# Patient Record
Sex: FEMALE | Race: WHITE | URBAN - METROPOLITAN AREA
[De-identification: names, ages, dates, MRNs, and addresses within clinical notes are randomized per-mention and may not be internally consistent; named-entity substitution may affect disease eponyms.]

---

## 2017-01-04 ENCOUNTER — COMMUNICATION - HEALTHEAST (OUTPATIENT)
Dept: INTERNAL MEDICINE | Facility: CLINIC | Age: 82
End: 2017-01-04

## 2017-01-04 ENCOUNTER — RECORDS - HEALTHEAST (OUTPATIENT)
Dept: ADMINISTRATIVE | Facility: OTHER | Age: 82
End: 2017-01-04

## 2017-01-04 DIAGNOSIS — R30.0 DYSURIA: ICD-10-CM

## 2017-01-05 ENCOUNTER — COMMUNICATION - HEALTHEAST (OUTPATIENT)
Dept: INTERNAL MEDICINE | Facility: CLINIC | Age: 82
End: 2017-01-05

## 2017-01-07 ENCOUNTER — COMMUNICATION - HEALTHEAST (OUTPATIENT)
Dept: SCHEDULING | Facility: CLINIC | Age: 82
End: 2017-01-07

## 2017-01-12 ENCOUNTER — COMMUNICATION - HEALTHEAST (OUTPATIENT)
Dept: INTERNAL MEDICINE | Facility: CLINIC | Age: 82
End: 2017-01-12

## 2017-01-12 DIAGNOSIS — N30.20 CHRONIC CYSTITIS: ICD-10-CM

## 2017-01-12 DIAGNOSIS — N39.0 UTI (URINARY TRACT INFECTION): ICD-10-CM

## 2017-01-20 ENCOUNTER — RECORDS - HEALTHEAST (OUTPATIENT)
Dept: ADMINISTRATIVE | Facility: OTHER | Age: 82
End: 2017-01-20

## 2017-04-24 ENCOUNTER — HOSPITAL ENCOUNTER (OUTPATIENT)
Dept: NEUROLOGY | Facility: CLINIC | Age: 82
Setting detail: THERAPIES SERIES
Discharge: STILL ADMITTED OR ADMINISTRATIVE | End: 2017-04-24
Attending: PSYCHIATRY & NEUROLOGY

## 2017-04-24 DIAGNOSIS — F02.80 DEMENTIA OF THE ALZHEIMER'S TYPE (H): ICD-10-CM

## 2017-04-24 DIAGNOSIS — G30.9 DEMENTIA OF THE ALZHEIMER'S TYPE (H): ICD-10-CM

## 2017-04-26 ENCOUNTER — COMMUNICATION - HEALTHEAST (OUTPATIENT)
Dept: NEUROLOGY | Facility: CLINIC | Age: 82
End: 2017-04-26

## 2017-04-27 ENCOUNTER — RECORDS - HEALTHEAST (OUTPATIENT)
Dept: ADMINISTRATIVE | Facility: OTHER | Age: 82
End: 2017-04-27

## 2017-05-03 ENCOUNTER — AMBULATORY - HEALTHEAST (OUTPATIENT)
Dept: NEUROLOGY | Facility: CLINIC | Age: 82
End: 2017-05-03

## 2017-05-03 ENCOUNTER — HOSPITAL ENCOUNTER (OUTPATIENT)
Dept: NEUROLOGY | Facility: CLINIC | Age: 82
Setting detail: THERAPIES SERIES
Discharge: STILL ADMITTED OR ADMINISTRATIVE | End: 2017-05-03
Attending: PSYCHIATRY & NEUROLOGY

## 2017-05-03 ENCOUNTER — HOSPITAL ENCOUNTER (OUTPATIENT)
Dept: OCCUPATIONAL THERAPY | Age: 82
Setting detail: THERAPIES SERIES
Discharge: STILL ADMITTED OR ADMINISTRATIVE | End: 2017-05-03
Attending: PSYCHIATRY & NEUROLOGY

## 2017-05-03 DIAGNOSIS — M81.0 OSTEOPOROSIS: ICD-10-CM

## 2017-05-03 DIAGNOSIS — R41.3 MEMORY LOSS: ICD-10-CM

## 2017-05-03 DIAGNOSIS — F03.90 DEMENTIA (H): ICD-10-CM

## 2017-05-03 DIAGNOSIS — R94.31 NONSPECIFIC ABNORMAL ELECTROCARDIOGRAM (ECG) (EKG): ICD-10-CM

## 2017-05-03 DIAGNOSIS — R41.89 COGNITIVE IMPAIRMENT: ICD-10-CM

## 2017-05-03 DIAGNOSIS — I10 ESSENTIAL HYPERTENSION: ICD-10-CM

## 2017-05-11 ENCOUNTER — COMMUNICATION - HEALTHEAST (OUTPATIENT)
Dept: NEUROLOGY | Facility: CLINIC | Age: 82
End: 2017-05-11

## 2017-05-11 DIAGNOSIS — F32.A DEPRESSION: ICD-10-CM

## 2017-05-16 ENCOUNTER — RECORDS - HEALTHEAST (OUTPATIENT)
Dept: ADMINISTRATIVE | Facility: OTHER | Age: 82
End: 2017-05-16

## 2017-05-18 ENCOUNTER — HOSPITAL ENCOUNTER (OUTPATIENT)
Dept: NEUROLOGY | Facility: CLINIC | Age: 82
Setting detail: THERAPIES SERIES
Discharge: STILL ADMITTED OR ADMINISTRATIVE | End: 2017-05-18
Attending: PSYCHIATRY & NEUROLOGY

## 2017-05-18 DIAGNOSIS — F03.90 UNSPECIFIED DEMENTIA WITHOUT BEHAVIORAL DISTURBANCE: ICD-10-CM

## 2017-05-22 ENCOUNTER — COMMUNICATION - HEALTHEAST (OUTPATIENT)
Dept: INTERNAL MEDICINE | Facility: CLINIC | Age: 82
End: 2017-05-22

## 2017-07-31 ENCOUNTER — HOSPITAL ENCOUNTER (OUTPATIENT)
Dept: NEUROLOGY | Facility: CLINIC | Age: 82
Setting detail: THERAPIES SERIES
Discharge: STILL ADMITTED OR ADMINISTRATIVE | End: 2017-07-31
Attending: PSYCHIATRY & NEUROLOGY

## 2017-07-31 DIAGNOSIS — F32.A DEPRESSION: ICD-10-CM

## 2017-08-18 ENCOUNTER — COMMUNICATION - HEALTHEAST (OUTPATIENT)
Dept: INTERNAL MEDICINE | Facility: CLINIC | Age: 82
End: 2017-08-18

## 2017-08-21 ENCOUNTER — COMMUNICATION - HEALTHEAST (OUTPATIENT)
Dept: INTERNAL MEDICINE | Facility: CLINIC | Age: 82
End: 2017-08-21

## 2017-08-28 ENCOUNTER — COMMUNICATION - HEALTHEAST (OUTPATIENT)
Dept: INTERNAL MEDICINE | Facility: CLINIC | Age: 82
End: 2017-08-28

## 2017-08-28 DIAGNOSIS — R42 VERTIGO: ICD-10-CM

## 2017-09-07 ENCOUNTER — OFFICE VISIT - HEALTHEAST (OUTPATIENT)
Dept: INTERNAL MEDICINE | Facility: CLINIC | Age: 82
End: 2017-09-07

## 2017-09-07 DIAGNOSIS — R41.3 MEMORY LOSS: ICD-10-CM

## 2017-09-07 DIAGNOSIS — I10 BENIGN ESSENTIAL HTN: ICD-10-CM

## 2017-09-07 DIAGNOSIS — F02.80 LATE ONSET ALZHEIMER'S DISEASE WITHOUT BEHAVIORAL DISTURBANCE (H): ICD-10-CM

## 2017-09-07 DIAGNOSIS — G30.1 LATE ONSET ALZHEIMER'S DISEASE WITHOUT BEHAVIORAL DISTURBANCE (H): ICD-10-CM

## 2017-09-07 DIAGNOSIS — I51.9 HEART DISEASE, UNSPECIFIED: ICD-10-CM

## 2017-09-07 ASSESSMENT — MIFFLIN-ST. JEOR: SCORE: 1287.29

## 2017-09-26 ENCOUNTER — COMMUNICATION - HEALTHEAST (OUTPATIENT)
Dept: INTERNAL MEDICINE | Facility: CLINIC | Age: 82
End: 2017-09-26

## 2017-09-27 ENCOUNTER — RECORDS - HEALTHEAST (OUTPATIENT)
Dept: ADMINISTRATIVE | Facility: OTHER | Age: 82
End: 2017-09-27

## 2017-09-27 ENCOUNTER — AMBULATORY - HEALTHEAST (OUTPATIENT)
Dept: INTERNAL MEDICINE | Facility: CLINIC | Age: 82
End: 2017-09-27

## 2017-10-11 ENCOUNTER — COMMUNICATION - HEALTHEAST (OUTPATIENT)
Dept: INTERNAL MEDICINE | Facility: CLINIC | Age: 82
End: 2017-10-11

## 2017-11-14 ENCOUNTER — COMMUNICATION - HEALTHEAST (OUTPATIENT)
Dept: INTERNAL MEDICINE | Facility: CLINIC | Age: 82
End: 2017-11-14

## 2017-12-19 ENCOUNTER — COMMUNICATION - HEALTHEAST (OUTPATIENT)
Dept: SCHEDULING | Facility: CLINIC | Age: 82
End: 2017-12-19

## 2017-12-26 ENCOUNTER — COMMUNICATION - HEALTHEAST (OUTPATIENT)
Dept: INTERNAL MEDICINE | Facility: CLINIC | Age: 82
End: 2017-12-26

## 2017-12-26 DIAGNOSIS — F03.90 DEMENTIA (H): ICD-10-CM

## 2018-01-03 ENCOUNTER — AMBULATORY - HEALTHEAST (OUTPATIENT)
Dept: INTERNAL MEDICINE | Facility: CLINIC | Age: 83
End: 2018-01-03

## 2018-01-03 DIAGNOSIS — R53.83 FATIGUE: ICD-10-CM

## 2018-01-04 ENCOUNTER — OFFICE VISIT - HEALTHEAST (OUTPATIENT)
Dept: INTERNAL MEDICINE | Facility: CLINIC | Age: 83
End: 2018-01-04

## 2018-01-04 DIAGNOSIS — R79.89 ELEVATED LFTS: ICD-10-CM

## 2018-01-04 DIAGNOSIS — K21.9 GERD (GASTROESOPHAGEAL REFLUX DISEASE): ICD-10-CM

## 2018-01-04 DIAGNOSIS — R05.9 COUGH: ICD-10-CM

## 2018-01-04 DIAGNOSIS — F02.80 LATE ONSET ALZHEIMER'S DISEASE WITHOUT BEHAVIORAL DISTURBANCE (H): ICD-10-CM

## 2018-01-04 DIAGNOSIS — N39.0 UTI (URINARY TRACT INFECTION): ICD-10-CM

## 2018-01-04 DIAGNOSIS — G30.1 LATE ONSET ALZHEIMER'S DISEASE WITHOUT BEHAVIORAL DISTURBANCE (H): ICD-10-CM

## 2018-01-04 DIAGNOSIS — D72.829 LEUKOCYTOSIS: ICD-10-CM

## 2018-01-04 LAB
ALBUMIN SERPL-MCNC: 3 G/DL (ref 3.5–5)
ALP SERPL-CCNC: 267 U/L (ref 45–120)
ALT SERPL W P-5'-P-CCNC: 103 U/L (ref 0–45)
ANION GAP SERPL CALCULATED.3IONS-SCNC: 14 MMOL/L (ref 5–18)
AST SERPL W P-5'-P-CCNC: 64 U/L (ref 0–40)
BASOPHILS # BLD AUTO: 0 THOU/UL (ref 0–0.2)
BASOPHILS NFR BLD AUTO: 0 % (ref 0–2)
BILIRUB SERPL-MCNC: 0.8 MG/DL (ref 0–1)
BUN SERPL-MCNC: 29 MG/DL (ref 8–28)
CALCIUM SERPL-MCNC: 9.6 MG/DL (ref 8.5–10.5)
CHLORIDE BLD-SCNC: 100 MMOL/L (ref 98–107)
CO2 SERPL-SCNC: 27 MMOL/L (ref 22–31)
CREAT SERPL-MCNC: 1.05 MG/DL (ref 0.6–1.1)
EOSINOPHIL COUNT (ABSOLUTE): 0 THOU/UL (ref 0–0.4)
EOSINOPHIL NFR BLD AUTO: 0 % (ref 0–6)
ERYTHROCYTE [DISTWIDTH] IN BLOOD BY AUTOMATED COUNT: 11.8 % (ref 11–14.5)
FLUAV AG SPEC QL IA: NORMAL
FLUBV AG SPEC QL IA: NORMAL
GFR SERPL CREATININE-BSD FRML MDRD: 50 ML/MIN/1.73M2
GLUCOSE BLD-MCNC: 139 MG/DL (ref 70–125)
HCT VFR BLD AUTO: 42.4 % (ref 35–47)
HGB BLD-MCNC: 13.8 G/DL (ref 12–16)
LYMPHOCYTES # BLD AUTO: 1.7 THOU/UL (ref 0.8–4.4)
LYMPHOCYTES NFR BLD AUTO: 8 % (ref 20–40)
MCH RBC QN AUTO: 28.8 PG (ref 27–34)
MCHC RBC AUTO-ENTMCNC: 32.6 G/DL (ref 32–36)
MCV RBC AUTO: 88 FL (ref 80–100)
MONOCYTES # BLD AUTO: 1.5 THOU/UL (ref 0–0.9)
MONOCYTES NFR BLD AUTO: 7 % (ref 2–10)
PLAT MORPH BLD: NORMAL
PLATELET # BLD AUTO: 406 THOU/UL (ref 140–440)
PMV BLD AUTO: 7.9 FL (ref 7–10)
POTASSIUM BLD-SCNC: 4.1 MMOL/L (ref 3.5–5)
PROT SERPL-MCNC: 7 G/DL (ref 6–8)
RBC # BLD AUTO: 4.81 MILL/UL (ref 3.8–5.4)
SODIUM SERPL-SCNC: 141 MMOL/L (ref 136–145)
TOTAL NEUTROPHILS-ABS(DIFF): 17.7 THOU/UL (ref 2–7.7)
TOTAL NEUTROPHILS-REL(DIFF): 85 % (ref 50–70)
WBC: 20.8 THOU/UL (ref 4–11)

## 2018-01-06 ENCOUNTER — RECORDS - HEALTHEAST (OUTPATIENT)
Dept: LAB | Facility: CLINIC | Age: 83
End: 2018-01-06

## 2018-01-06 LAB
ALBUMIN UR-MCNC: ABNORMAL MG/DL
APPEARANCE UR: ABNORMAL
BACTERIA #/AREA URNS HPF: ABNORMAL HPF
BILIRUB UR QL STRIP: NEGATIVE
COLOR UR AUTO: ABNORMAL
GLUCOSE UR STRIP-MCNC: NEGATIVE MG/DL
HGB UR QL STRIP: NEGATIVE
KETONES UR STRIP-MCNC: ABNORMAL MG/DL
LEUKOCYTE ESTERASE UR QL STRIP: ABNORMAL
MUCOUS THREADS #/AREA URNS LPF: ABNORMAL LPF
NITRATE UR QL: POSITIVE
PH UR STRIP: 5.5 [PH] (ref 4.5–8)
RBC #/AREA URNS AUTO: ABNORMAL HPF
RENAL EPI CELLS #/AREA URNS HPF: ABNORMAL LPF
SP GR UR STRIP: 1.02 (ref 1–1.03)
SQUAMOUS #/AREA URNS AUTO: ABNORMAL LPF
TRANS CELLS #/AREA URNS HPF: ABNORMAL LPF
UROBILINOGEN UR STRIP-ACNC: ABNORMAL
WBC #/AREA URNS AUTO: >100 HPF

## 2018-01-08 ENCOUNTER — HOSPITAL ENCOUNTER (OUTPATIENT)
Dept: CT IMAGING | Facility: CLINIC | Age: 83
Discharge: HOME | End: 2018-01-08
Attending: INTERNAL MEDICINE

## 2018-01-08 DIAGNOSIS — R79.89 ELEVATED LFTS: ICD-10-CM

## 2018-01-08 DIAGNOSIS — D72.829 LEUKOCYTOSIS: ICD-10-CM

## 2018-01-08 LAB
BACTERIA SPEC CULT: ABNORMAL
BACTERIA SPEC CULT: ABNORMAL

## 2018-01-09 ENCOUNTER — COMMUNICATION - HEALTHEAST (OUTPATIENT)
Dept: INTERNAL MEDICINE | Facility: CLINIC | Age: 83
End: 2018-01-09

## 2018-01-10 ENCOUNTER — OFFICE VISIT - HEALTHEAST (OUTPATIENT)
Dept: FAMILY MEDICINE | Facility: CLINIC | Age: 83
End: 2018-01-10

## 2018-01-10 ENCOUNTER — COMMUNICATION - HEALTHEAST (OUTPATIENT)
Dept: SCHEDULING | Facility: CLINIC | Age: 83
End: 2018-01-10

## 2018-01-10 ENCOUNTER — COMMUNICATION - HEALTHEAST (OUTPATIENT)
Dept: INTERNAL MEDICINE | Facility: CLINIC | Age: 83
End: 2018-01-10

## 2018-01-10 DIAGNOSIS — R10.9 ABDOMINAL PAIN: ICD-10-CM

## 2018-01-10 DIAGNOSIS — G30.9 DEMENTIA OF THE ALZHEIMER'S TYPE (H): ICD-10-CM

## 2018-01-10 DIAGNOSIS — F02.80 DEMENTIA OF THE ALZHEIMER'S TYPE (H): ICD-10-CM

## 2018-01-10 DIAGNOSIS — I10 HYPERTENSION: ICD-10-CM

## 2018-01-10 LAB
ALBUMIN UR-MCNC: NEGATIVE MG/DL
APPEARANCE UR: CLEAR
BASOPHILS # BLD AUTO: 0.2 THOU/UL (ref 0–0.2)
BASOPHILS NFR BLD AUTO: 1 % (ref 0–2)
BILIRUB UR QL STRIP: NEGATIVE
COLOR UR AUTO: YELLOW
EOSINOPHIL # BLD AUTO: 0.3 THOU/UL (ref 0–0.4)
EOSINOPHIL NFR BLD AUTO: 2 % (ref 0–6)
ERYTHROCYTE [DISTWIDTH] IN BLOOD BY AUTOMATED COUNT: 11.5 % (ref 11–14.5)
GLUCOSE UR STRIP-MCNC: NEGATIVE MG/DL
HCT VFR BLD AUTO: 40.3 % (ref 35–47)
HGB BLD-MCNC: 13.4 G/DL (ref 12–16)
HGB UR QL STRIP: NEGATIVE
KETONES UR STRIP-MCNC: NEGATIVE MG/DL
LEUKOCYTE ESTERASE UR QL STRIP: NEGATIVE
LYMPHOCYTES # BLD AUTO: 2.4 THOU/UL (ref 0.8–4.4)
LYMPHOCYTES NFR BLD AUTO: 14 % (ref 20–40)
MCH RBC QN AUTO: 29.7 PG (ref 27–34)
MCHC RBC AUTO-ENTMCNC: 33.1 G/DL (ref 32–36)
MCV RBC AUTO: 90 FL (ref 80–100)
MONOCYTES # BLD AUTO: 1.3 THOU/UL (ref 0–0.9)
MONOCYTES NFR BLD AUTO: 7 % (ref 2–10)
NEUTROPHILS # BLD AUTO: 13.2 THOU/UL (ref 2–7.7)
NEUTROPHILS NFR BLD AUTO: 76 % (ref 50–70)
NITRATE UR QL: NEGATIVE
PH UR STRIP: 5.5 [PH] (ref 5–8)
PLATELET # BLD AUTO: 339 THOU/UL (ref 140–440)
PMV BLD AUTO: 7.8 FL (ref 7–10)
RBC # BLD AUTO: 4.49 MILL/UL (ref 3.8–5.4)
SP GR UR STRIP: 1.02 (ref 1–1.03)
UROBILINOGEN UR STRIP-ACNC: NORMAL
WBC: 17.4 THOU/UL (ref 4–11)

## 2018-01-12 ENCOUNTER — COMMUNICATION - HEALTHEAST (OUTPATIENT)
Dept: INTERNAL MEDICINE | Facility: CLINIC | Age: 83
End: 2018-01-12

## 2018-01-12 DIAGNOSIS — I48.91 ATRIAL FIBRILLATION (H): ICD-10-CM

## 2018-01-13 ENCOUNTER — HOME CARE/HOSPICE - HEALTHEAST (OUTPATIENT)
Dept: HOME HEALTH SERVICES | Facility: HOME HEALTH | Age: 83
End: 2018-01-13

## 2018-01-15 ENCOUNTER — HOME CARE/HOSPICE - HEALTHEAST (OUTPATIENT)
Dept: HOME HEALTH SERVICES | Facility: HOME HEALTH | Age: 83
End: 2018-01-15

## 2018-01-15 ENCOUNTER — AMBULATORY - HEALTHEAST (OUTPATIENT)
Dept: CARE COORDINATION | Facility: HOSPITAL | Age: 83
End: 2018-01-15

## 2018-01-16 ENCOUNTER — COMMUNICATION - HEALTHEAST (OUTPATIENT)
Dept: HOME HEALTH SERVICES | Facility: HOME HEALTH | Age: 83
End: 2018-01-16

## 2018-01-17 ENCOUNTER — HOME CARE/HOSPICE - HEALTHEAST (OUTPATIENT)
Dept: HOME HEALTH SERVICES | Facility: HOME HEALTH | Age: 83
End: 2018-01-17

## 2018-01-17 ENCOUNTER — OFFICE VISIT - HEALTHEAST (OUTPATIENT)
Dept: CARDIOLOGY | Facility: CLINIC | Age: 83
End: 2018-01-17

## 2018-01-17 DIAGNOSIS — I48.19 PERSISTENT ATRIAL FIBRILLATION (H): ICD-10-CM

## 2018-01-19 ENCOUNTER — COMMUNICATION - HEALTHEAST (OUTPATIENT)
Dept: INTERNAL MEDICINE | Facility: CLINIC | Age: 83
End: 2018-01-19

## 2018-01-19 ENCOUNTER — COMMUNICATION - HEALTHEAST (OUTPATIENT)
Dept: HOME HEALTH SERVICES | Facility: HOME HEALTH | Age: 83
End: 2018-01-19

## 2018-01-19 ENCOUNTER — HOME CARE/HOSPICE - HEALTHEAST (OUTPATIENT)
Dept: HOME HEALTH SERVICES | Facility: HOME HEALTH | Age: 83
End: 2018-01-19

## 2018-01-19 DIAGNOSIS — R19.7 DIARRHEA: ICD-10-CM

## 2018-01-22 ENCOUNTER — HOME CARE/HOSPICE - HEALTHEAST (OUTPATIENT)
Dept: HOME HEALTH SERVICES | Facility: HOME HEALTH | Age: 83
End: 2018-01-22

## 2018-01-22 ENCOUNTER — RECORDS - HEALTHEAST (OUTPATIENT)
Dept: LAB | Facility: CLINIC | Age: 83
End: 2018-01-22

## 2018-01-22 LAB
ANION GAP SERPL CALCULATED.3IONS-SCNC: 7 MMOL/L (ref 5–18)
BUN SERPL-MCNC: 34 MG/DL (ref 8–28)
CALCIUM SERPL-MCNC: 9 MG/DL (ref 8.5–10.5)
CHLORIDE BLD-SCNC: 112 MMOL/L (ref 98–107)
CO2 SERPL-SCNC: 28 MMOL/L (ref 22–31)
CREAT SERPL-MCNC: 0.88 MG/DL (ref 0.6–1.1)
GFR SERPL CREATININE-BSD FRML MDRD: >60 ML/MIN/1.73M2
GLUCOSE BLD-MCNC: 86 MG/DL (ref 70–125)
POTASSIUM BLD-SCNC: 3.8 MMOL/L (ref 3.5–5)
SODIUM SERPL-SCNC: 147 MMOL/L (ref 136–145)

## 2018-01-23 ENCOUNTER — HOME CARE/HOSPICE - HEALTHEAST (OUTPATIENT)
Dept: HOME HEALTH SERVICES | Facility: HOME HEALTH | Age: 83
End: 2018-01-23

## 2018-01-24 ENCOUNTER — HOME CARE/HOSPICE - HEALTHEAST (OUTPATIENT)
Dept: HOME HEALTH SERVICES | Facility: HOME HEALTH | Age: 83
End: 2018-01-24

## 2018-01-25 ENCOUNTER — COMMUNICATION - HEALTHEAST (OUTPATIENT)
Dept: INTERNAL MEDICINE | Facility: CLINIC | Age: 83
End: 2018-01-25

## 2018-01-25 ENCOUNTER — HOME CARE/HOSPICE - HEALTHEAST (OUTPATIENT)
Dept: HOME HEALTH SERVICES | Facility: HOME HEALTH | Age: 83
End: 2018-01-25

## 2018-01-31 ENCOUNTER — COMMUNICATION - HEALTHEAST (OUTPATIENT)
Dept: INTERNAL MEDICINE | Facility: CLINIC | Age: 83
End: 2018-01-31

## 2018-01-31 DIAGNOSIS — R05.9 COUGH: ICD-10-CM

## 2018-02-07 ENCOUNTER — RECORDS - HEALTHEAST (OUTPATIENT)
Dept: ADMINISTRATIVE | Facility: OTHER | Age: 83
End: 2018-02-07

## 2018-03-06 ENCOUNTER — COMMUNICATION - HEALTHEAST (OUTPATIENT)
Dept: INTERNAL MEDICINE | Facility: CLINIC | Age: 83
End: 2018-03-06

## 2018-05-01 ENCOUNTER — COMMUNICATION - HEALTHEAST (OUTPATIENT)
Dept: INTERNAL MEDICINE | Facility: CLINIC | Age: 83
End: 2018-05-01

## 2018-05-02 ENCOUNTER — OFFICE VISIT - HEALTHEAST (OUTPATIENT)
Dept: CARDIOLOGY | Facility: CLINIC | Age: 83
End: 2018-05-02

## 2018-05-02 DIAGNOSIS — I10 ESSENTIAL HYPERTENSION: ICD-10-CM

## 2018-05-02 DIAGNOSIS — I48.91 ATRIAL FIBRILLATION, UNSPECIFIED TYPE (H): ICD-10-CM

## 2018-05-03 ENCOUNTER — COMMUNICATION - HEALTHEAST (OUTPATIENT)
Dept: INTERNAL MEDICINE | Facility: CLINIC | Age: 83
End: 2018-05-03

## 2018-05-08 ENCOUNTER — COMMUNICATION - HEALTHEAST (OUTPATIENT)
Dept: INTERNAL MEDICINE | Facility: CLINIC | Age: 83
End: 2018-05-08

## 2018-05-25 ENCOUNTER — COMMUNICATION - HEALTHEAST (OUTPATIENT)
Dept: CARDIOLOGY | Facility: CLINIC | Age: 83
End: 2018-05-25

## 2018-06-12 ENCOUNTER — RECORDS - HEALTHEAST (OUTPATIENT)
Dept: LAB | Facility: CLINIC | Age: 83
End: 2018-06-12

## 2018-06-12 LAB
ANION GAP SERPL CALCULATED.3IONS-SCNC: 8 MMOL/L (ref 5–18)
BASOPHILS # BLD AUTO: 0 THOU/UL (ref 0–0.2)
BASOPHILS NFR BLD AUTO: 0 % (ref 0–2)
BUN SERPL-MCNC: 16 MG/DL (ref 8–28)
CALCIUM SERPL-MCNC: 9.3 MG/DL (ref 8.5–10.5)
CHLORIDE BLD-SCNC: 103 MMOL/L (ref 98–107)
CO2 SERPL-SCNC: 30 MMOL/L (ref 22–31)
CREAT SERPL-MCNC: 1.05 MG/DL (ref 0.6–1.1)
EOSINOPHIL # BLD AUTO: 0 THOU/UL (ref 0–0.4)
EOSINOPHIL NFR BLD AUTO: 0 % (ref 0–6)
ERYTHROCYTE [DISTWIDTH] IN BLOOD BY AUTOMATED COUNT: 14.2 % (ref 11–14.5)
GFR SERPL CREATININE-BSD FRML MDRD: 50 ML/MIN/1.73M2
GLUCOSE BLD-MCNC: 114 MG/DL (ref 70–125)
HCT VFR BLD AUTO: 41.6 % (ref 35–47)
HGB BLD-MCNC: 13.5 G/DL (ref 12–16)
LYMPHOCYTES # BLD AUTO: 1.4 THOU/UL (ref 0.8–4.4)
LYMPHOCYTES NFR BLD AUTO: 19 % (ref 20–40)
MCH RBC QN AUTO: 30.2 PG (ref 27–34)
MCHC RBC AUTO-ENTMCNC: 32.5 G/DL (ref 32–36)
MCV RBC AUTO: 93 FL (ref 80–100)
MONOCYTES # BLD AUTO: 0.4 THOU/UL (ref 0–0.9)
MONOCYTES NFR BLD AUTO: 5 % (ref 2–10)
NEUTROPHILS # BLD AUTO: 5.5 THOU/UL (ref 2–7.7)
NEUTROPHILS NFR BLD AUTO: 76 % (ref 50–70)
PLATELET # BLD AUTO: 259 THOU/UL (ref 140–440)
PMV BLD AUTO: 10.5 FL (ref 8.5–12.5)
POTASSIUM BLD-SCNC: 3.6 MMOL/L (ref 3.5–5)
RBC # BLD AUTO: 4.47 MILL/UL (ref 3.8–5.4)
SODIUM SERPL-SCNC: 141 MMOL/L (ref 136–145)
TSH SERPL DL<=0.005 MIU/L-ACNC: 1.69 UIU/ML (ref 0.3–5)
VIT B12 SERPL-MCNC: 349 PG/ML (ref 213–816)
WBC: 7.4 THOU/UL (ref 4–11)

## 2018-06-13 LAB — 25(OH)D3 SERPL-MCNC: 30.1 NG/ML (ref 30–80)

## 2018-07-27 ENCOUNTER — COMMUNICATION - HEALTHEAST (OUTPATIENT)
Dept: INTERNAL MEDICINE | Facility: CLINIC | Age: 83
End: 2018-07-27

## 2018-07-27 DIAGNOSIS — I48.19 PERSISTENT ATRIAL FIBRILLATION (H): ICD-10-CM

## 2018-07-28 ENCOUNTER — COMMUNICATION - HEALTHEAST (OUTPATIENT)
Dept: CARDIOLOGY | Facility: CLINIC | Age: 83
End: 2018-07-28

## 2018-07-28 DIAGNOSIS — I48.19 PERSISTENT ATRIAL FIBRILLATION (H): ICD-10-CM

## 2018-09-07 ENCOUNTER — COMMUNICATION - HEALTHEAST (OUTPATIENT)
Dept: NEUROLOGY | Facility: CLINIC | Age: 83
End: 2018-09-07

## 2018-11-21 ENCOUNTER — HOSPITAL ENCOUNTER (OUTPATIENT)
Dept: NEUROLOGY | Facility: CLINIC | Age: 83
Setting detail: THERAPIES SERIES
Discharge: STILL ADMITTED OR ADMINISTRATIVE | End: 2018-11-21
Attending: PSYCHIATRY & NEUROLOGY

## 2018-12-05 ENCOUNTER — COMMUNICATION - HEALTHEAST (OUTPATIENT)
Dept: NEUROLOGY | Facility: CLINIC | Age: 83
End: 2018-12-05

## 2018-12-05 DIAGNOSIS — G47.00 INSOMNIA, UNSPECIFIED: ICD-10-CM

## 2018-12-13 ENCOUNTER — COMMUNICATION - HEALTHEAST (OUTPATIENT)
Dept: NEUROLOGY | Facility: CLINIC | Age: 83
End: 2018-12-13

## 2018-12-13 DIAGNOSIS — G47.00 INSOMNIA, UNSPECIFIED: ICD-10-CM

## 2019-01-04 ENCOUNTER — HOSPITAL ENCOUNTER (OUTPATIENT)
Dept: NEUROLOGY | Facility: CLINIC | Age: 84
Setting detail: THERAPIES SERIES
Discharge: STILL ADMITTED OR ADMINISTRATIVE | End: 2019-01-04
Attending: PSYCHIATRY & NEUROLOGY

## 2019-01-09 ENCOUNTER — COMMUNICATION - HEALTHEAST (OUTPATIENT)
Dept: NEUROLOGY | Facility: CLINIC | Age: 84
End: 2019-01-09

## 2019-01-09 ENCOUNTER — AMBULATORY - HEALTHEAST (OUTPATIENT)
Dept: NEUROLOGY | Facility: CLINIC | Age: 84
End: 2019-01-09

## 2019-01-17 ENCOUNTER — COMMUNICATION - HEALTHEAST (OUTPATIENT)
Dept: NEUROLOGY | Facility: CLINIC | Age: 84
End: 2019-01-17

## 2019-01-21 ENCOUNTER — AMBULATORY - HEALTHEAST (OUTPATIENT)
Dept: NEUROLOGY | Facility: CLINIC | Age: 84
End: 2019-01-21

## 2019-02-01 ENCOUNTER — COMMUNICATION - HEALTHEAST (OUTPATIENT)
Dept: NEUROLOGY | Facility: CLINIC | Age: 84
End: 2019-02-01

## 2019-02-08 ENCOUNTER — COMMUNICATION - HEALTHEAST (OUTPATIENT)
Dept: NEUROLOGY | Facility: CLINIC | Age: 84
End: 2019-02-08

## 2019-02-08 ENCOUNTER — AMBULATORY - HEALTHEAST (OUTPATIENT)
Dept: NEUROLOGY | Facility: CLINIC | Age: 84
End: 2019-02-08

## 2019-02-12 ENCOUNTER — COMMUNICATION - HEALTHEAST (OUTPATIENT)
Dept: INTERNAL MEDICINE | Facility: CLINIC | Age: 84
End: 2019-02-12

## 2019-02-12 DIAGNOSIS — F03.90 DEMENTIA (H): ICD-10-CM

## 2019-03-11 ENCOUNTER — RECORDS - HEALTHEAST (OUTPATIENT)
Dept: LAB | Facility: CLINIC | Age: 84
End: 2019-03-11

## 2019-03-11 LAB
ALBUMIN UR-MCNC: ABNORMAL MG/DL
APPEARANCE UR: ABNORMAL
BACTERIA #/AREA URNS HPF: ABNORMAL HPF
BILIRUB UR QL STRIP: NEGATIVE
COLOR UR AUTO: YELLOW
GLUCOSE UR STRIP-MCNC: NEGATIVE MG/DL
HGB UR QL STRIP: NEGATIVE
KETONES UR STRIP-MCNC: NEGATIVE MG/DL
LEUKOCYTE ESTERASE UR QL STRIP: ABNORMAL
MUCOUS THREADS #/AREA URNS LPF: ABNORMAL LPF
NITRATE UR QL: POSITIVE
PH UR STRIP: 5.5 [PH] (ref 4.5–8)
RBC #/AREA URNS AUTO: ABNORMAL HPF
SP GR UR STRIP: 1.02 (ref 1–1.03)
SQUAMOUS #/AREA URNS AUTO: ABNORMAL LPF
UROBILINOGEN UR STRIP-ACNC: ABNORMAL
WBC #/AREA URNS AUTO: ABNORMAL HPF

## 2019-03-15 LAB — BACTERIA SPEC CULT: ABNORMAL

## 2019-05-12 ENCOUNTER — RECORDS - HEALTHEAST (OUTPATIENT)
Dept: LAB | Facility: CLINIC | Age: 84
End: 2019-05-12

## 2019-05-12 LAB
ALBUMIN UR-MCNC: NEGATIVE MG/DL
APPEARANCE UR: ABNORMAL
BACTERIA #/AREA URNS HPF: ABNORMAL HPF
BILIRUB UR QL STRIP: NEGATIVE
COLOR UR AUTO: YELLOW
GLUCOSE UR STRIP-MCNC: NEGATIVE MG/DL
HGB UR QL STRIP: NEGATIVE
KETONES UR STRIP-MCNC: NEGATIVE MG/DL
LEUKOCYTE ESTERASE UR QL STRIP: ABNORMAL
MUCOUS THREADS #/AREA URNS LPF: ABNORMAL LPF
NITRATE UR QL: POSITIVE
PH UR STRIP: 7 [PH] (ref 4.5–8)
RBC #/AREA URNS AUTO: ABNORMAL HPF
SP GR UR STRIP: 1.02 (ref 1–1.03)
SQUAMOUS #/AREA URNS AUTO: >100 LPF
UROBILINOGEN UR STRIP-ACNC: ABNORMAL
WBC #/AREA URNS AUTO: ABNORMAL HPF

## 2019-05-17 LAB
BACTERIA SPEC CULT: ABNORMAL
BACTERIA SPEC CULT: ABNORMAL

## 2019-07-10 ENCOUNTER — RECORDS - HEALTHEAST (OUTPATIENT)
Dept: LAB | Facility: CLINIC | Age: 84
End: 2019-07-10

## 2019-07-10 LAB
ANION GAP SERPL CALCULATED.3IONS-SCNC: 12 MMOL/L (ref 5–18)
BASOPHILS # BLD AUTO: 0 THOU/UL (ref 0–0.2)
BASOPHILS NFR BLD AUTO: 0 % (ref 0–2)
BUN SERPL-MCNC: 29 MG/DL (ref 8–28)
CALCIUM SERPL-MCNC: 9.4 MG/DL (ref 8.5–10.5)
CHLORIDE BLD-SCNC: 106 MMOL/L (ref 98–107)
CO2 SERPL-SCNC: 24 MMOL/L (ref 22–31)
CREAT SERPL-MCNC: 1.03 MG/DL (ref 0.6–1.1)
EOSINOPHIL # BLD AUTO: 0.1 THOU/UL (ref 0–0.4)
EOSINOPHIL NFR BLD AUTO: 0 % (ref 0–6)
ERYTHROCYTE [DISTWIDTH] IN BLOOD BY AUTOMATED COUNT: 14 % (ref 11–14.5)
GFR SERPL CREATININE-BSD FRML MDRD: 51 ML/MIN/1.73M2
GLUCOSE BLD-MCNC: 120 MG/DL (ref 70–125)
HCT VFR BLD AUTO: 41.1 % (ref 35–47)
HGB BLD-MCNC: 12.8 G/DL (ref 12–16)
LYMPHOCYTES # BLD AUTO: 1.2 THOU/UL (ref 0.8–4.4)
LYMPHOCYTES NFR BLD AUTO: 9 % (ref 20–40)
MCH RBC QN AUTO: 29.6 PG (ref 27–34)
MCHC RBC AUTO-ENTMCNC: 31.1 G/DL (ref 32–36)
MCV RBC AUTO: 95 FL (ref 80–100)
MONOCYTES # BLD AUTO: 0.2 THOU/UL (ref 0–0.9)
MONOCYTES NFR BLD AUTO: 2 % (ref 2–10)
NEUTROPHILS # BLD AUTO: 11.3 THOU/UL (ref 2–7.7)
NEUTROPHILS NFR BLD AUTO: 89 % (ref 50–70)
PLATELET # BLD AUTO: 295 THOU/UL (ref 140–440)
PMV BLD AUTO: 10.5 FL (ref 8.5–12.5)
POTASSIUM BLD-SCNC: 3.6 MMOL/L (ref 3.5–5)
PROCALCITONIN SERPL-MCNC: 0.05 NG/ML (ref 0–0.49)
RBC # BLD AUTO: 4.32 MILL/UL (ref 3.8–5.4)
SODIUM SERPL-SCNC: 142 MMOL/L (ref 136–145)
WBC: 12.8 THOU/UL (ref 4–11)

## 2019-09-24 ENCOUNTER — AMBULATORY - HEALTHEAST (OUTPATIENT)
Dept: OTHER | Facility: CLINIC | Age: 84
End: 2019-09-24

## 2019-09-24 ENCOUNTER — DOCUMENTATION ONLY (OUTPATIENT)
Dept: OTHER | Facility: CLINIC | Age: 84
End: 2019-09-24

## 2019-10-23 ENCOUNTER — HOSPITAL ENCOUNTER (OUTPATIENT)
Dept: NEUROLOGY | Facility: CLINIC | Age: 84
Setting detail: THERAPIES SERIES
Discharge: STILL ADMITTED OR ADMINISTRATIVE | End: 2019-10-23
Attending: PSYCHIATRY & NEUROLOGY

## 2019-10-28 ENCOUNTER — COMMUNICATION - HEALTHEAST (OUTPATIENT)
Dept: INTERNAL MEDICINE | Facility: CLINIC | Age: 84
End: 2019-10-28

## 2019-11-11 ENCOUNTER — COMMUNICATION - HEALTHEAST (OUTPATIENT)
Dept: NEUROLOGY | Facility: CLINIC | Age: 84
End: 2019-11-11

## 2019-11-11 DIAGNOSIS — F32.A DEPRESSION: ICD-10-CM

## 2019-11-14 ENCOUNTER — RECORDS - HEALTHEAST (OUTPATIENT)
Dept: LAB | Facility: CLINIC | Age: 84
End: 2019-11-14

## 2019-11-14 LAB
ALBUMIN UR-MCNC: ABNORMAL MG/DL
APPEARANCE UR: ABNORMAL
BACTERIA #/AREA URNS HPF: ABNORMAL HPF
BILIRUB UR QL STRIP: NEGATIVE
COLOR UR AUTO: YELLOW
GLUCOSE UR STRIP-MCNC: NEGATIVE MG/DL
HGB UR QL STRIP: NEGATIVE
HYALINE CASTS #/AREA URNS LPF: ABNORMAL LPF
KETONES UR STRIP-MCNC: NEGATIVE MG/DL
LEUKOCYTE ESTERASE UR QL STRIP: ABNORMAL
MUCOUS THREADS #/AREA URNS LPF: ABNORMAL LPF
NITRATE UR QL: POSITIVE
PH UR STRIP: 5.5 [PH] (ref 4.5–8)
RBC #/AREA URNS AUTO: ABNORMAL HPF
SP GR UR STRIP: 1.02 (ref 1–1.03)
SQUAMOUS #/AREA URNS AUTO: ABNORMAL LPF
UROBILINOGEN UR STRIP-ACNC: ABNORMAL
WBC #/AREA URNS AUTO: ABNORMAL HPF
WBC CLUMPS #/AREA URNS HPF: PRESENT /[HPF]

## 2019-11-16 LAB
BACTERIA SPEC CULT: ABNORMAL
BACTERIA SPEC CULT: ABNORMAL

## 2019-11-21 ENCOUNTER — RECORDS - HEALTHEAST (OUTPATIENT)
Dept: LAB | Facility: CLINIC | Age: 84
End: 2019-11-21

## 2019-11-21 LAB
ANION GAP SERPL CALCULATED.3IONS-SCNC: 10 MMOL/L (ref 5–18)
BASOPHILS # BLD AUTO: 0 THOU/UL (ref 0–0.2)
BASOPHILS NFR BLD AUTO: 0 % (ref 0–2)
BUN SERPL-MCNC: 27 MG/DL (ref 8–28)
C REACTIVE PROTEIN LHE: 2.9 MG/DL (ref 0–0.8)
CALCIUM SERPL-MCNC: 9 MG/DL (ref 8.5–10.5)
CHLORIDE BLD-SCNC: 105 MMOL/L (ref 98–107)
CO2 SERPL-SCNC: 29 MMOL/L (ref 22–31)
CREAT SERPL-MCNC: 1.22 MG/DL (ref 0.6–1.1)
EOSINOPHIL # BLD AUTO: 0.1 THOU/UL (ref 0–0.4)
EOSINOPHIL NFR BLD AUTO: 1 % (ref 0–6)
ERYTHROCYTE [DISTWIDTH] IN BLOOD BY AUTOMATED COUNT: 14.6 % (ref 11–14.5)
ERYTHROCYTE [SEDIMENTATION RATE] IN BLOOD BY WESTERGREN METHOD: 29 MM/HR (ref 0–20)
GFR SERPL CREATININE-BSD FRML MDRD: 42 ML/MIN/1.73M2
GLUCOSE BLD-MCNC: 71 MG/DL (ref 70–125)
HCT VFR BLD AUTO: 43.6 % (ref 35–47)
HGB BLD-MCNC: 13.2 G/DL (ref 12–16)
LYMPHOCYTES # BLD AUTO: 1.5 THOU/UL (ref 0.8–4.4)
LYMPHOCYTES NFR BLD AUTO: 17 % (ref 20–40)
MCH RBC QN AUTO: 29.6 PG (ref 27–34)
MCHC RBC AUTO-ENTMCNC: 30.3 G/DL (ref 32–36)
MCV RBC AUTO: 98 FL (ref 80–100)
MONOCYTES # BLD AUTO: 0.7 THOU/UL (ref 0–0.9)
MONOCYTES NFR BLD AUTO: 8 % (ref 2–10)
NEUTROPHILS # BLD AUTO: 6.7 THOU/UL (ref 2–7.7)
NEUTROPHILS NFR BLD AUTO: 74 % (ref 50–70)
PLATELET # BLD AUTO: 262 THOU/UL (ref 140–440)
PMV BLD AUTO: 10.7 FL (ref 8.5–12.5)
POTASSIUM BLD-SCNC: 4.3 MMOL/L (ref 3.5–5)
RBC # BLD AUTO: 4.46 MILL/UL (ref 3.8–5.4)
SODIUM SERPL-SCNC: 144 MMOL/L (ref 136–145)
URATE SERPL-MCNC: 8.3 MG/DL (ref 2–7.5)
WBC: 9 THOU/UL (ref 4–11)

## 2019-11-29 ENCOUNTER — RECORDS - HEALTHEAST (OUTPATIENT)
Dept: LAB | Facility: CLINIC | Age: 84
End: 2019-11-29

## 2019-12-02 LAB — POTASSIUM BLD-SCNC: 3.4 MMOL/L (ref 3.5–5)

## 2019-12-04 ENCOUNTER — RECORDS - HEALTHEAST (OUTPATIENT)
Dept: ADMINISTRATIVE | Facility: OTHER | Age: 84
End: 2019-12-04

## 2019-12-16 ENCOUNTER — RECORDS - HEALTHEAST (OUTPATIENT)
Dept: LAB | Facility: CLINIC | Age: 84
End: 2019-12-16

## 2019-12-16 LAB — URATE SERPL-MCNC: 5.9 MG/DL (ref 2–7.5)

## 2021-05-25 ENCOUNTER — RECORDS - HEALTHEAST (OUTPATIENT)
Dept: ADMINISTRATIVE | Facility: CLINIC | Age: 86
End: 2021-05-25

## 2021-05-26 ENCOUNTER — RECORDS - HEALTHEAST (OUTPATIENT)
Dept: ADMINISTRATIVE | Facility: CLINIC | Age: 86
End: 2021-05-26

## 2021-05-28 ENCOUNTER — RECORDS - HEALTHEAST (OUTPATIENT)
Dept: ADMINISTRATIVE | Facility: CLINIC | Age: 86
End: 2021-05-28

## 2021-05-29 ENCOUNTER — RECORDS - HEALTHEAST (OUTPATIENT)
Dept: ADMINISTRATIVE | Facility: CLINIC | Age: 86
End: 2021-05-29

## 2021-05-30 ENCOUNTER — RECORDS - HEALTHEAST (OUTPATIENT)
Dept: ADMINISTRATIVE | Facility: CLINIC | Age: 86
End: 2021-05-30

## 2021-05-31 ENCOUNTER — RECORDS - HEALTHEAST (OUTPATIENT)
Dept: ADMINISTRATIVE | Facility: CLINIC | Age: 86
End: 2021-05-31

## 2021-05-31 VITALS — BODY MASS INDEX: 27.57 KG/M2 | HEIGHT: 70 IN

## 2021-05-31 VITALS — HEIGHT: 70 IN | WEIGHT: 168 LBS | BODY MASS INDEX: 24.05 KG/M2

## 2021-05-31 VITALS — WEIGHT: 210 LBS | BODY MASS INDEX: 30.13 KG/M2

## 2021-06-01 ENCOUNTER — RECORDS - HEALTHEAST (OUTPATIENT)
Dept: ADMINISTRATIVE | Facility: CLINIC | Age: 86
End: 2021-06-01

## 2021-06-02 ENCOUNTER — RECORDS - HEALTHEAST (OUTPATIENT)
Dept: ADMINISTRATIVE | Facility: CLINIC | Age: 86
End: 2021-06-02

## 2021-06-02 NOTE — PROGRESS NOTES
Assessment / Impression   1.  Dementia the Alzheimer type, advanced  2.  Depression currently in remission  3.  Other medical problems as noted      Plan:   1.  Continue present therapies including current dose antidepressant therapy  2.  Consider appropriate wellness interventions from a medical perspective    A long conversation held with the patient and daughter Ngozi.  At this point in time the patient appears to be doing reasonably well.  I spent 30 minutes counseling the patient and daughter with respect to wellness strategies and the appropriate consideration for care restriction as the patient enters more advanced stages of neurodegeneration.  All questions answered.    Total time for evaluation 30 minutes with majority time spent counseling.      Subjective:     HPI: Farzaneh Layton is a 84 y.o. female with above-noted diagnoses who is seen in follow-up.  The patient continues to do reasonably well within the long-term care setting.  She is no longer ambulatory.  She is now a 2 person transfer given her size.  The daughter believes that the patient has periods of sadness but overall depression would appear to be in remission.    The patient cannot engage in conversation.  She denies physical complaints at this time.          Review of Systems:          As above        Objective:   There were no vitals filed for this visit.    No results found for this or any previous visit (from the past 24 hour(s)).    Physical Exam: Neatly groomed and casually dressed, the patient seated for evaluation.  She is quiet during the course of interview.  Her responses to questions are limited and indicate reduced thought content.  No evidence of delusional content noted.  No evidence of agitation or irritability.  Affect is somewhat strained but bright and as has been noted previously.  No fluctuation in level of consciousness or belén distractibility clearly identified.  No speech or motor stereotypies.

## 2021-06-02 NOTE — TELEPHONE ENCOUNTER
Patient has not been seen in 22 months.  Pcp no longer with [facility].    Nicole Candelaria, RN, [facility] Nurse Triage/Med Refills RN

## 2021-06-02 NOTE — PROGRESS NOTES
Persons accompanying you (the patient) today: family    How have you been doing since we saw you last? Please note any concerns.  Patient is here for general check up.     Please list any recent hospitalizations/surgeries/procedures you've had since we saw you last:  None     Have you had any falls since your last visit? Yes: a few tumbles in the past year     Do you have any pain today? No

## 2021-06-03 NOTE — TELEPHONE ENCOUNTER
[facility] clinic called stating that Netta's primary CNP was very worried about her BP and thought the cause could be from the Effexor and the dosage she was taking.  She has now changed it to Effexor .5 mg with the family in agree ance with that choice and wanted to give a verbal update on mediation change. No need to call clinic unless you do not agree with the change.

## 2021-06-09 ENCOUNTER — RECORDS - HEALTHEAST (OUTPATIENT)
Dept: ADMINISTRATIVE | Facility: CLINIC | Age: 86
End: 2021-06-09

## 2021-06-10 NOTE — PROGRESS NOTES
Persons accompanying you (the patient) today: Daughters: Stephanie and Ngozi     How have you been doing since we saw you last? Please note any concerns.  Pt did not want her weight taking. Pt is concerned about her being weak and dizzy as well as her mobility is getting worse.    Please list any surgeries or procedures you have had since we saw you last:  none    Have you had any falls since your last visit? Yes: last Friday on 4/28/17    Do you have any pain today? No    With whom do you currently reside? (alone, spouse, family, assisted living, nursing home)  Pt live at [facility]   If assisted living or nursing home, please note name and location of facility:

## 2021-06-10 NOTE — PROGRESS NOTES
"Occupational Therapy  Occupational Therapy    Medicare Insurance    Units: 1 Phoebe  Total Minutes: 30    Medical Diagnosis: Dementia  Treatment Diagnosis: Cognitive Impairment  Referring Practitioner: Dr. Melvin Thompson  Date of onset: 4-24-17  Start of care: May 3, 2017    Ms.Elizabeth RODRÍGUEZ Layton was referred by Melvin Thompson MD for an occupational therapy outpatient cognitive evaluation. The assessments used in this evaluation will help determine if cognitive impairment is present and if so, how functional daily activity may be affected.  Following the assessments, the occupational therapist may offer education and recommendations to assist caregivers in providing the optimal level of support for their loved one. Farzaneh was seen on 5/3/2017 and was accompanied by her two daughters, although they both remained in the waiting room.  Netta was seen almost two years ago for a baseline cognitive assessment, see results below. Today, she presented to the appointment seated in a wheelchair. Both daughters \"warned\" this OT that their mother was \"extremely crabby\" today and although her affect was flat and she did not initiate social interaction, she was generally mild in nature and her mood did not appear to be agitated or resistive. However, FCI through the evaluation, Netta began complaining of \"not feeling well\" and dizzy. OT retrieved a glass of water for her and we were able to complete the full CPT. When the MOCA was started, her complaints of dizziness increased to the point that OT asked the GAURI to check her blood pressure. Her blood pressure were as follows:    - 213/103 (electronic cuff)   - 190/80 (manual cuff)   - 169/98 (wrist cuff)    Due to complaints of feeling ill, no further cognitive assessments completed.     Pain: none stated    OBJECTIVE  Results of assessments are as follows:     5-3-17  8-21-15  ACL:  NT  NT  CPT:  4.6 /5.6 4.9  MOCA: not completed    The above assessment scores indicate a " "Moderate  level of impairment.     ASSESSMENT  Recommendations:  Cognitive functioning recommendations: 4.6: The assessment scores indicate a recommendation for 24 hour supervision. Assistance with managing medications and finances is recommended as Netta may have increased difficulty with complex problem solving. Problem solving is often challenging especially when it is not anticipated or expected. Farzaneh may lack insight into her deficits indicating an increased need for assistance with complex tasks requiring accuracy for safety. Assistance for all meal preparation is recommended. Driving is not recommended for Farzaneh due to difficulty with divided attention and complex problem solving. Further, Farzaneh may benefit from increased structure throughout the day creating healthy habits and routines and eliminating her need to problem solve from one task to another.    Netta stated she is living \"somewhere\" where she receives all meals and assistance with cleaning and medications. Her family assists with financial management but stated she does not need any assistance with self cares. I would recommend that staff/caregiver assist with maintaining a structured daily schedule.     Thank you for this referral.  If I can be of further assistance, please do not hesitate to contact me.    MEDICARE PATIENT: Yes: HCIN #[id number]; Provider # ; Certification Dates: from 5-3-17 to 5-3-17    Physician Recommendation:  1. I certify the need for these services furnished within this plan and while under my care. I agree with the therapist's recommendation for plan of care.  2. If there is any recommendation for modification of therapy plan, please indicate below.    Magui Dubois OT    "

## 2021-06-10 NOTE — PROGRESS NOTES
Assessment / Impression   1.  Dementia possibly mixed in etiology including dementia the also retyped and dementia with Lewy bodies, rule out additional etiologies  2.  Depression  3.  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  Blood test screen  2.  Discontinue Prozac  3.  PT evaluation  4.  Begin venlafaxine titrating in a standard fashion to 150 mg per day      A long conversation held with the patient and daughters in attendance.  The patient is demonstrating significant orthostasis associated with weakness, fatigue, constipation and possibly glare intolerance.  Her clinical exam today does evidence some extrapyramidal impairments and I am suspicious that we may be witnessing the emergence of a Lewy body disorder.  With these thoughts in mind I have suggested the above-noted interventions.  In addition, I am advising that the patient be trialed in a dementia level program.  I am concerned regarding fluid intake and her safety in ambulation in her apartment given the problems noted above.    Total time for evaluation 45 minutes with majority time spent counseling.  Follow-up in 2 weeks.      Subjective:     HPI: Farzaneh Layton is a 82 y.o. female with above-noted diagnoses returns for follow-up.  The patient is noted to have a CPT test score of 4.6 demonstrating continued declines.  In addition, the patient continues to fall and she complains of orthostasis as well as other symptoms suggesting autonomic nervous system dysfunction.  Depression is worsening as well.  The patient is irritable resistive to direction from her daughters.  She continues occupy an assisted living apartment with minimal assistance according to family.    The patient presents as a depressed elderly female with evidence of parkinsonism.  I do note hypokalemia as well as some reduction of spontaneous movements.  The patient denies symptoms of depression as well as somatic  complaints but I do feel her to be an unreliable historian.  Staff and the family note the patient be quite tearful, negatively self focused irritable and resistive.          Review of Systems:          As above        Objective:     Vitals:    05/03/17 1032 05/03/17 1033   BP: 142/90 128/74   Pulse: 92 77       No results found for this or any previous visit (from the past 24 hour(s)).    Physical Exam: The patient demonstrates evidence of global cognitive impairments including impairment in self-awareness and reasoning as well as short-term memory.  Attentional function also appears to be impaired.  Motorically I note some masking of the face, reduction in spontaneous motor activity, mild cogwheel rigidity in the left upper extremity and possibly the right with fairly normal axial motor tone.  Significant orthostasis and orthostatic hypotension is noted today.  Affect is depressed and mood congruent.

## 2021-06-10 NOTE — PROGRESS NOTES
Persons accompanying you (the patient) today: Daughter Ngozi     How have you been doing since we saw you last? Please note any concerns.  Medication f/u, pt refuse to do weight. No standing BP.     Please list any surgeries or procedures you have had since we saw you last:  None     Have you had any falls since your last visit? Yes: daughter states couple falls no big injuries.     Do you have any pain today? No    With whom do you currently reside? (alone, spouse, family, assisted living, nursing home)  [facility]   If assisted living or nursing home, please note name and location of facility: same

## 2021-06-10 NOTE — PROGRESS NOTES
Persons accompanying you (the patient) today: daughter Stephanie and Ngozi     How have you been doing since we saw you last? Please note any concerns.  Medication f/u, daughters concern about vertigo. Pt refuse to do weight, 1 standing BP, pt states she can't stand for long.     Please list any surgeries or procedures you have had since we saw you last:  None     Have you had any falls since your last visit? No    Do you have any pain today? No    With whom do you currently reside? (alone, spouse, family, assisted living, nursing home)  Pt live at [facility]   If assisted living or nursing home, please note name and location of facility:

## 2021-06-10 NOTE — PROGRESS NOTES
"  Assessment / Impression   1.  Dementia possibly multifactorial etiology  2.  Depression, severe but improved  3.  Orthostasis      Plan:   1.  Continue venlafaxine 150 mg p.o. daily although consider upper titration of 225 mg if necessary  2.  Continue other therapies as prescribed    Long conversation held with the daughters were present today with her mother.  Clear evidence of improvement has been noted since initiation of therapy with venlafaxine including diminution in irritability and argumentativeness.  Having said this, however, multiple signs and symptoms of depression persist.  The patient is noted to be quite negative, self focused and to be expressing a passive death wish.  It is hoped that we will continue to build momentum towards recovery with present therapies and it is also hoped that maybe the patient's somatic complaints may also improve as they may represent somatic preoccupation secondary to her mood disorder.  Total time for this evaluation was 30 minutes with the majority time spent in counseling with respect to options for management of depression including electroconvulsive therapy.      Subjective:     HPI: Farzaneh Layton is a 82 y.o. female with above-noted diagnoses who returns for follow-up.  Daughters are gratified that there has been improvement in their mother's irritability although they continue to note multiple signs of depression including negativity and frequent expressions of a death wish.  The patient today repeatedly states that this examiner \"I do not care\" but I do note her to have a softer affect into appear to be feeling better.          Review of Systems:          She can offer no complaints at this time.  She does not appear to be in any acute physical distress.        Objective:     Vitals:    05/18/17 1139 05/18/17 1140   BP: 123/70 140/71   Pulse: 64 73       No results found for this or any previous visit (from the past 24 hour(s)).    Physical Exam: As noted " above.  The patient demonstrates a softer affect and is a little less argumentative than that noted previously.  Clear evidence of depression persist however.  No evidence of visual or auditory hallucinations.  No delirium

## 2021-06-10 NOTE — PROGRESS NOTES
1.  Orthostasis  2.  Dementia the Alzheimer type  3.  Depression NOS      Plan:   1.  Occupational therapy assessment  2.  Check standing blood pressure    A long conversation held with the patient and daughter in attendance.  This patient currently resides I believe with an assisted living program.  Increasing concern voiced on the part of staff and family suggested me inappropriate/insufficient environmental support.  I am concerned regarding the patient's current use of antihypertensives and possible deficient fluid intake as reasons for difficulties noted below.  I did discuss with the daughter the idea prescriptive fluid intake and we will check an occupational therapy assessment as soon as possible (patient scored 4.9 in the CPT in August 2015).    Total time for this evaluation 30 minutes with majority time spent in counseling.      Subjective:     HPI: Binu is an 83 y/o female with above-noted diagnoses who returns for follow-up.  The patient appears to be doing well however the daughter notes that there have been increasing difficulties caring for mother within her independent or assisted living apartment on a senior campus.  The patient has been complaining more of malaise and more frequently of lightheadedness and fatigue.  I do note that she continues on 3 different antihypertensive therapies.  There is also less than optimal supervision of the patient given prior Occupational Therapy test scores.  At this time the patient reports no difficulties whatsoever.  Her insight and reasoning abilities as well as short-term recall is very impaired.  On questioning she offers no physical complaints.          Review of Systems:          As above        Objective:   There were no vitals filed for this visit.    No results found for this or any previous visit (from the past 24 hour(s)).    Physical Exam: As noted previously.  From a cognitive perspective the patient is alert pleasant and directable.  She  appears reasonably attentive with no fluctuation in level of consciousness or belén distractibility.  Affect is pleasant mood slightly irritable.  No evidence of abnormal thought content or form.  Orthostatic blood pressure pending.

## 2021-06-12 NOTE — PROGRESS NOTES
Assessment / Impression   1.  Dementia of the Alzheimer type   2.  Depression, improved on present therapy      Plan:   1.  Increase venlafaxine to 225 mg per day  2.  Advice given with respect to environmental support particularly as it relates to upcoming vacation    Total time for evaluation 25 minutes with majority time spent in counseling.  All questions answered.      Subjective:     HPI: Farzaneh Layton is a 82 y.o. female with above-noted diagnoses who returns for follow-up.  The patient's daughter reports that the patient has significantly improved on present antidepressant therapy with a greater than 50% resolution of depression signs and symptoms.  The patient does remain a bit irritable and family is of the opinion that further interventions are warranted.    The patient today is alert pleasant and directable.  Mild impulsivity is noted.  Poor insight and reasoning noted.  The patient denies any difficulties including depression at this time.          Review of Systems:          As above        Objective:     Vitals:    07/31/17 1250 07/31/17 1254   BP: 138/74 118/80   Pulse: (!) 53 64       No results found for this or any previous visit (from the past 24 hour(s)).    Physical Exam: As noted previously.  From a cognitive perspective, the patient is alert pleasant and directable.  Mild impulsivity is noted.  Thought processes are concrete.  Mood is mildly irritable affect is bright.  Global cognitive impairments as noted previously.

## 2021-06-12 NOTE — PROGRESS NOTES
Persons accompanying you (the patient) today: Daughter Ngozi    How have you been doing since we saw you last? Please note any concerns.  Pt. Has been pretty good.    Please list any surgeries or procedures you have had since we saw you last:  none    Have you had any falls since your last visit? Yes:     Do you have any pain today? No    With whom do you currently reside? (alone, spouse, family, assisted living, nursing home)  Pt. Lives in an assistant living.

## 2021-06-12 NOTE — PROGRESS NOTES
Farzaneh Layton  1935      Assessment and Plan:  1. Advanced dementia - but doing remarkably well at [facility]; no agitation or related issues  2. Frequent falls/ phys Rx and family working on this  3. HTN- stable on current regimen  4. Sees Dr. Thompson @  meds reviewed/stable- remarkably so  5. RTC 6 months; flu shot and labs today        Chief Complaint: f/u dementia (@[facility])    Visit diagnoses:    1. Benign essential HTN     2. Late onset Alzheimer's disease without behavioral disturbance  DNR (Do Not Resuscitate, Includes DNI)   3. Memory Lapses Or Loss     4. Diastolic Dysfunction     5. Lives in independent group home       Patient Active Problem List   Diagnosis     Osteoporosis     Diastolic Dysfunction     Memory Lapses Or Loss     Osteoarthritis     Vertigo     Raynaud's Phenomenon     Bruxism     Dementia of the Alzheimer's type     Lives in independent group home     Do not resuscitate     Past Medical History:   Diagnosis Date     Abnormal EKG     noted pre-op 2014/ ST-T changes and poor R wave progress     Arthritis      Bruxism (teeth grinding)      Bursitis of hip 2014    left/surgical repair     Dementia      Diastolic dysfunction 2007    mild changes on echo     Hypertension      Pulmonary embolism 2008    post op/shoulder surgery     Raynaud phenomenon      Past Surgical History:   Procedure Laterality Date     AR ARTHROPLASTY TIBIAL PLATEAU      Description: Knee Replacement;  Proc Date: 01/01/2009;     AR ARTHROPLASTY TIBIAL PLATEAU      Description: Knee Replacement;  Proc Date: 01/01/2009;     AR RECONSTR TOTAL SHOULDER IMPLANT      Description: Shoulder Arthroplasty Total Shoulder Replacement;  Proc Date: 05/19/2008;  Comments: post op pulmonary embolism     Social History     Social History     Marital status:      Spouse name: N/A     Number of children: 5     Years of education: N/A     Occupational History     retired      Social History Main Topics     Smoking status: Former  "Smoker     Quit date: 1977     Smokeless tobacco: Never Used     Alcohol use Not on file      Comment: rare no hx of problems or family concerns     Drug use: Not on file     Sexual activity: Not on file     Other Topics Concern     Not on file     Social History Narrative    .   in  pancreatic Ca; 5 grown kids; non smoker/minimal alcohol. Dementia type problems ~. Referred to -geriatric services.         In 2015- [facility];() fax [phone]; [phone]    \"Netta\" . multifactorial dementia with irritability and confabulation ;     Dr. Thompson /Geriatrics;     Daughters Ngozi kearney [phone]; /[phone].; 3 sons...; very unfortunate difficult situation for all involved. Non drinker/non smoker (); son Chidi (38)     Family History   Problem Relation Age of Onset     Pancreatic cancer Mother      Heart disease Father      Meds:  Current Outpatient Prescriptions   Medication Sig Dispense Refill     amLODIPine (NORVASC) 5 MG tablet TAKE 1 TABLET BY MOUTH DAILY 90 tablet 3     aspirin 81 MG EC tablet Take 1 tablet (81 mg total) by mouth daily. 90 tablet 3     atenolol (TENORMIN) 50 MG tablet Take 1 tablet (50 mg total) by mouth daily. 90 tablet 3     hydroCHLOROthiazide (MICROZIDE) 12.5 mg capsule TAKE 1 CAPSULE BY MOUTH EVERY DAY 90 capsule 3     meclizine (ANTIVERT) 25 mg tablet Take 1 tablet (25 mg total) by mouth 3 (three) times a day as needed for dizziness or nausea. 30 tablet 1     memantine (NAMENDA) 10 MG tablet Take 1 tablet (10 mg total) by mouth 2 (two) times a day. 60 tablet 6     nabumetone (RELAFEN) 500 MG tablet TAKE 1 TO 2 TABLETS BY MOUTH DAILY 180 tablet 3     potassium chloride SA (K-DUR,KLOR-CON) 20 MEQ tablet Take 1 tablet (20 mEq total) by mouth daily. 90 tablet 3     rivastigmine (EXELON) 9.5 mg/24 hr Place 1 patch on the skin daily. 30 patch 12     venlafaxine (EFFEXOR XR) 75 MG 24 hr capsule Take 3 capsules (225 mg total) by " "mouth daily. 90 capsule 6     No current facility-administered medications for this visit.        No Known Allergies    ROS: complete review of symptoms otherwise negative except as noted below    S: here with daughter Ngozi. All smiles today confused but still makes amusing observations. dtr very pleased with care at [facility] main issue is falls. Chronic other symptoms noted. No easy intervention episodic dizziness/verigo goes back 2+ decades        O:   Vitals:    09/07/17 1102   BP: 134/70   Patient Site: Left Arm   Patient Position: Sitting   Cuff Size: Adult Regular   Pulse: 60   Weight: 168 lb (76.2 kg)   Height: 5' 10\" (1.778 m)       Physical Exam:  General- bruxism/ smiles/jokes/confabulates/ pleasantly confused. Here supportive dtr Ngozi (new mother at age 48)   VS- see above  HEENT- neg   Neck- no adenopathy/thyromegaly/bruits  Chest- clear   Cor- reg no murmurs/gallops/ectopics  Abdomen- soft non tender, no masses; no organomegaly  Extremities: non pitting dependent edema, good distal pulses  Neuro- in wheelchair. Smiles grinds teeth confused.     Lab Results   Component Value Date    WBC 11.5 (H) 05/03/2017    HGB 14.2 05/03/2017    HCT 43.0 05/03/2017    MCV 94 05/03/2017     05/03/2017     Results for orders placed or performed in visit on 02/16/15   Basic Metabolic Panel   Result Value Ref Range    Sodium 141 136 - 145 mmol/L    Potassium 4.7 3.5 - 5.1 mmol/L    Chloride 100 98 - 107 mmol/L    CO2 33 (H) 21 - 32 mmol/L    Anion Gap, Calculation 8 5 - 18 mmol/L    Glucose 104 70 - 125 mg/dL    Calcium 9.6 8.5 - 10.1 mg/dL    BUN 22 8 - 28 mg/dL    Creatinine 1.00 0.60 - 1.30 mg/dL    GFR MDRD Af Amer >60 >60 mL/min/1.73m2    GFR MDRD Non Af Amer 53 (L) >60 mL/min/1.73m2       Time 40 min > 50 counseling reviewing multiple reports from [facility]/ER visit May '17 with labs/ans questions      Deacon Bowman MD              "

## 2021-06-15 NOTE — PROGRESS NOTES
Farzaneh Layton  1935      Assessment and Plan:  1. Cough- CXR ok; trial of prednisone/zpak/cough med. ? Aspiration but CXR ok  2. Nausea; mild elevation of LFTS/leukocytosis- check CT abd/pelvis; attn GB/liver/Pancreas ; also NH/[facility] called and check UA/UC  3. Profound dementia- but stable till a week or two ago per daughters/ some wgt loss  4. DNR as before; symptom control primary issue    ADD (1/09/18)- see CT results no intra-abdominal pathology but pneumonia right base which likely explains leukocytosis( not seen on CXR); also UA+ as expected and will treat with Cipro. Done with zpak for pneumonia. Condition overall with dementia remains poor. Reviewed in detail with concerned daughter Ngozi. Also, they are considering switching to onsite medical care which may be better for patient/family - difficult to get to office      Chief Complaint: multiple     Visit diagnoses:    1. Cough  XR Chest 2 Views    Comprehensive Metabolic Panel    HM2(CBC w/o Differential)    Influenza A/B Rapid Test    azithromycin (ZITHROMAX Z-PEARL) 250 MG tablet    codeine-guaiFENesin (GUAIFENESIN AC)  mg/5 mL liquid    predniSONE (DELTASONE) 20 MG tablet    Manual Differential    Automated Differential   2. GERD (gastroesophageal reflux disease)  famotidine (PEPCID) 20 MG tablet   3. Leukocytosis  CT Abdomen With Oral Without IV Contrast   4. Elevated LFTs  CT Abdomen With Oral Without IV Contrast   5. Late onset Alzheimer's disease without behavioral disturbance     6. Lives in independent group home         Meds:  Current Outpatient Prescriptions   Medication Sig Dispense Refill     aspirin 81 MG EC tablet Take 1 tablet (81 mg total) by mouth daily. 90 tablet 3     atenolol (TENORMIN) 50 MG tablet Take 1 tablet (50 mg total) by mouth daily. 90 tablet 3     hydroCHLOROthiazide (MICROZIDE) 12.5 mg capsule TAKE 1 CAPSULE BY MOUTH EVERY DAY 90 capsule 3     meclizine (ANTIVERT) 25 mg tablet Take 1 tablet (25 mg total) by  mouth 3 (three) times a day as needed for dizziness or nausea. 30 tablet 1     memantine (NAMENDA) 10 MG tablet Take 1 tablet (10 mg total) by mouth 2 (two) times a day. 60 tablet 6     nabumetone (RELAFEN) 500 MG tablet TAKE 1 TO 2 TABLETS BY MOUTH DAILY 180 tablet 3     rivastigmine (EXELON) 9.5 mg/24 hr Place 1 patch on the skin daily. 30 patch 12     venlafaxine (EFFEXOR XR) 75 MG 24 hr capsule Take 3 capsules (225 mg total) by mouth daily. 90 capsule 6     azithromycin (ZITHROMAX Z-PEARL) 250 MG tablet Take 2 tablets (500 mg) on  Day 1,  followed by 1 tablet (250 mg) once daily on Days 2 through 5. 6 tablet 0     codeine-guaiFENesin (GUAIFENESIN AC)  mg/5 mL liquid Take 10 mL by mouth 4 (four) times a day as needed for cough. 120 mL 0     famotidine (PEPCID) 20 MG tablet Take 1 tablet (20 mg total) by mouth 2 (two) times a day. (for stomach acid) 60 tablet 1     predniSONE (DELTASONE) 20 MG tablet Take 1 tablet (20 mg total) by mouth 2 (two) times a day. (take with food)-for cough 10 tablet 0     No current facility-administered medications for this visit.      Patient Active Problem List   Diagnosis     Osteoporosis     Diastolic Dysfunction     Osteoarthritis     Vertigo     Raynaud's Phenomenon     Bruxism     Dementia of the Alzheimer's type     Lives in independent group home     Do not resuscitate     Past Medical History:   Diagnosis Date     Abnormal EKG     noted pre-op 2014/ ST-T changes and poor R wave progress     Arthritis      Bruxism (teeth grinding)      Bursitis of hip 2014    left/surgical repair     Dementia      Diastolic dysfunction 2007    mild changes on echo     Fecal incontinence 09/27/2017    dementia/multifactorial- meds to be reviewed/declines GI consult     Hypertension      Pulmonary embolism 2008    post op/shoulder surgery     Raynaud phenomenon      Social History     Social History     Marital status:      Spouse name: N/A     Number of children: 5     Years of  "education: N/A     Occupational History     retired      Social History Main Topics     Smoking status: Former Smoker     Quit date: 1977     Smokeless tobacco: Never Used     Alcohol use Not on file      Comment: rare no hx of problems or family concerns     Drug use: Not on file     Sexual activity: Not on file     Other Topics Concern     Not on file     Social History Narrative    .   in  pancreatic Ca; 5 grown kids; non smoker/minimal alcohol. Dementia type problems ~. Referred to -geriatric services.         In 2015- [facility];() fax [phone]; [phone]    \"Netta\" . multifactorial dementia with irritability and confabulation ;     Dr. Thompson /Geriatrics;     Daughters Ngozi cell [phone]; /[phone].; 3 sons...; very unfortunate difficult situation for all involved. Non drinker/non smoker (); son Chidi (38)     Family History   Problem Relation Age of Onset     Pancreatic cancer Mother      Heart disease Father        No Known Allergies    ROS: complete review of symptoms otherwise negative except as noted below    S:  Stephanie and Ngozi here. Pt with demential occasional appropriate observations / but \"I'm fine\". Typical refrain but lots of ongoing symptoms. Daughters vigilant as reviewed with Kayla VÁSQUEZ at NH       O:   Vitals:    18 0931   BP: 118/78   Patient Site: Left Arm   Patient Position: Sitting   Cuff Size: Adult Regular   Pulse: 80   Temp: 98.5  F (36.9  C)   TempSrc: Tympanic   SpO2: 91%       Physical Exam:  General- clenched jaw/ demented occasional smile  VS- see above  HEENT- neg   Neck- no adenopathy/thyromegaly/bruits  Chest- clear   Cor- reg no murmurs/gallops/ectopics  Extremities: no edema, good distal pulses  Neuro- Cr. NN-  intact, alert,   Abdomen- soft non tender, no masses; no organomegaly      Recent Results (from the past 240 hour(s))   Comprehensive Metabolic Panel   Result Value Ref Range    Sodium 141 " 136 - 145 mmol/L    Potassium 4.1 3.5 - 5.0 mmol/L    Chloride 100 98 - 107 mmol/L    CO2 27 22 - 31 mmol/L    Anion Gap, Calculation 14 5 - 18 mmol/L    Glucose 139 (H) 70 - 125 mg/dL    BUN 29 (H) 8 - 28 mg/dL    Creatinine 1.05 0.60 - 1.10 mg/dL    GFR MDRD Af Amer >60 >60 mL/min/1.73m2    GFR MDRD Non Af Amer 50 (L) >60 mL/min/1.73m2    Bilirubin, Total 0.8 0.0 - 1.0 mg/dL    Calcium 9.6 8.5 - 10.5 mg/dL    Protein, Total 7.0 6.0 - 8.0 g/dL    Albumin 3.0 (L) 3.5 - 5.0 g/dL    Alkaline Phosphatase 267 (H) 45 - 120 U/L    AST 64 (H) 0 - 40 U/L     (H) 0 - 45 U/L   HM2(CBC w/o Differential)   Result Value Ref Range    WBC 20.8 (H) 4.0 - 11.0 thou/uL    RBC 4.81 3.80 - 5.40 mill/uL    Hemoglobin 13.8 12.0 - 16.0 g/dL    Hematocrit 42.4 35.0 - 47.0 %    MCV 88 80 - 100 fL    MCH 28.8 27.0 - 34.0 pg    MCHC 32.6 32.0 - 36.0 g/dL    RDW 11.8 11.0 - 14.5 %    Platelets 406 140 - 440 thou/uL    MPV 7.9 7.0 - 10.0 fL   Influenza A/B Rapid Test   Result Value Ref Range    Influenza  A, Rapid Antigen No Influenza A antigen detected No Influenza A antigen detected    Influenza B, Rapid Antigen No Influenza B antigen detected No Influenza B antigen detected   Manual Differential   Result Value Ref Range    Total Neutrophils % 85 (H) 50 - 70 %    Lymphocytes % 8 (L) 20 - 40 %    Monocytes % 7 2 - 10 %    Eosinophils %  0 0 - 6 %    Basophils % 0 0 - 2 %    Total Neutrophils Absolute 17.7 (H) 2.0 - 7.7 thou/ul    Lymphocytes Absolute 1.7 0.8 - 4.4 thou/uL    Monocytes Absolute 1.5 (H) 0.0 - 0.9 thou/uL    Eosinophils Absolute 0.0 0.0 - 0.4 thou/uL    Basophils Absolute 0.0 0.0 - 0.2 thou/uL    Platelet Estimate Normal Normal   Study Result   XR CHEST 2 VIEWS  1/4/2018 10:17 AM     INDICATION: Cough  COMPARISON: 5/7/2013     FINDINGS: Benign calcified granuloma left upper lobe. Slight hyperinflation. Old left rib fractures. No acute findings.         Deacon Bowman MD      Time 40 min >50% counseling review with  concerned daughters/call to NH/Rn going over plan etc/     CT ABDOMEN PELVIS W ORAL W IV CONTRAST  1/8/2018 12:40 PM      INDICATION: elevated LFTs/nausea  TECHNIQUE: CT abdomen and pelvis. Multiplanar reformation images (MPR). Dose reduction techniques were used.   IV CONTRAST: Iohexol (Omni) 75mL  COMPARISON: Chest CT 5/27/2014     FINDINGS:  LUNG BASES: Band of airspace disease and mild atelectasis medial right lung base. There is bronchial wall thickening and mucous plugging all suggestive of pneumonia, possible atypical organism. Left lung bases clear.     ABDOMEN: Mild focal fatty infiltration along falciform ligament. Stable small posterior hepatic cyst with liver otherwise normal. Gallbladder, bile ducts, pancreas and adrenals negative. Calcified splenic granulomata. Small renal cysts with kidneys   otherwise negative.     PELVIS: Bowel unremarkable, nothing inflammatory or obstructive. No adnexal lesion or free fluid.     MUSCULOSKELETAL: Degenerative changes throughout lumbar spine.     IMPRESSION:   CONCLUSION:  1.  Focal consolidation right lung base consistent with pneumonia.  2.  No acute intra-abdominal process identified.

## 2021-06-15 NOTE — PROGRESS NOTES
[facility], [place]--Electrophysiology    Assessment/Plan:      Problem List Items Addressed This Visit     Atrial fibrillation, unspecified type - Primary    Relevant Medications    rivaroxaban (XARELTO) 20 mg Tab        1.  Persistent atrial fibrillation: Newly diagnosed 1/10/2018.  She appears to be asymptomatic, though symptoms status is not entirely clear due to ongoing, but improving, pneumonia.  Ventricular response is well controlled.  We had a lengthy discussion of the physiology and natural progression of atrial fibrillation including treatment options of rate control versus rhythm control depending upon the presence of symptoms.  We discussed the possibility of cardioversion to clarify symptoms status.  She would be eligible for cardioversion in 3 weeks provided she misses no doses of Xarelto.  She was given some information to review at home.  Her daughter will discuss with her mother and other family members.    She was reassured that atrial fibrillation is not life-threatening, but carries an increased risk for stroke.  She has a [id number] score of 6 for age >75, female gender, hypertension, and prior PE.  Guidelines recommend long-term anticoagulation.  She was started on Xarelto for stroke prophylaxis, but does not qualify for dose adjustment and was started on short-term starter pack for DVT.  A new prescription was sent for Xarelto 20 mg daily to be taken with her evening meal.  We discussed the importance of taking this with a full meal to ensure proper absorption, thus stroke prophylaxis.       However, she is not an ideal candidate for long-term OAC due to frequent falls.  She has a HAS-BLED score of 2 for age >65 and bleeding risk.  We discussed the alternative of left atrial appendage occlusion with the watchman device, including requirement of general anesthesia, pre-and postprocedural YODIT's, risks for complications, and hospitalization.  With her advanced degree of dementia, she is not  an ideal candidate, but this option could potentially be further explored.  She was given information to review at home.  She and her family will discuss her options.    2.  Hypertension: Blood pressure at target today.    Subjective:      Farzaneh Layton, a pleasant 82-year-old woman, comes in today accompanied by her daughter for EP evaluation of atrial fibrillation.  She was newly diagnosed with atrial fibrillation with controlled ventricular response on 1/10/2018 with a subsequent hospitalization for pneumonia.  Onset is unknown.  She does not have awareness of arrhythmia and appears to be asymptomatic, but is a poor historian due to advanced dementia for which she lives in a memory care unit.  She also has a history of diastolic heart failure and hypertension.  She also has a prior history of pulmonary embolism after shoulder surgery in 2008.  She was started on Xarelto for stroke prophylaxis, but is not an ideal candidate for long-term anticoagulation therapy due to frequent falls.  She is ambulating with a walker and participating in physical therapy.    Netta Peterson states that she is beginning to feel a bit better, though still has a stuffy nose and a cough.  She denies chest discomfort, palpitations, shortness of breath, paroxysmal nocturnal dyspnea, orthopnea, lightheadedness, dizziness, pre-syncope, or syncope.    Medical, surgical, family, social history, and medications were reviewed and updated as necessary.    Patient Active Problem List   Diagnosis     Osteoporosis     Essential hypertension     Diastolic Dysfunction     Osteoarthritis     Vertigo     Raynaud's Phenomenon     Bruxism     Dementia of the Alzheimer's type     Lives in independent group home     Do not resuscitate     Pneumonia     HCAP (healthcare-associated pneumonia)     Acute respiratory insufficiency     Atrial fibrillation, unspecified type     Generalized weakness       Past Medical History:   Diagnosis Date     Abnormal EKG      noted pre-op 2014/ ST-T changes and poor R wave progress     Arthritis      Bruxism (teeth grinding)      Bursitis of hip 2014    left/surgical repair     Dementia      Diastolic dysfunction 2007    mild changes on echo     Fecal incontinence 09/27/2017    dementia/multifactorial- meds to be reviewed/declines GI consult     Hypertension      Pulmonary embolism 2008    post op/shoulder surgery     Raynaud phenomenon        Past Surgical History:   Procedure Laterality Date     [place] ARTHROPLASTY TIBIAL PLATEAU      Description: Knee Replacement;  Proc Date: 01/01/2009;     MI ARTHROPLASTY TIBIAL PLATEAU      Description: Knee Replacement;  Proc Date: 01/01/2009;     MI RECONSTR TOTAL SHOULDER IMPLANT      Description: Shoulder Arthroplasty Total Shoulder Replacement;  Proc Date: 05/19/2008;  Comments: post op pulmonary embolism       No Known Allergies    Current Outpatient Prescriptions   Medication Sig Dispense Refill     acetaminophen (TYLENOL) 500 MG tablet Take 1,000 mg by mouth every 6 (six) hours as needed for pain.       amoxicillin-clavulanate (AUGMENTIN) 875-125 mg per tablet Take 1 tablet by mouth 2 (two) times a day for 5 days. 10 tablet 0     aspirin 81 MG EC tablet Take 1 tablet (81 mg total) by mouth daily. 90 tablet 3     atenolol (TENORMIN) 50 MG tablet Take 1 tablet (50 mg total) by mouth daily. 90 tablet 3     famotidine (PEPCID) 20 MG tablet Take 1 tablet (20 mg total) by mouth 2 (two) times a day. (for stomach acid) 60 tablet 1     hydroCHLOROthiazide (MICROZIDE) 12.5 mg capsule Take 12.5 mg by mouth daily.       meclizine (ANTIVERT) 25 mg tablet Take 1 tablet (25 mg total) by mouth 3 (three) times a day as needed for dizziness or nausea. 30 tablet 1     memantine (NAMENDA) 10 MG tablet Take 1 tablet (10 mg total) by mouth 2 (two) times a day. 180 tablet 3     nabumetone (RELAFEN) 500 MG tablet Take 500 mg by mouth daily.       nabumetone (RELAFEN) 500 MG tablet Take 500 mg by mouth daily as  "needed for pain (in addition to scheduled dose if needed).       potassium chloride SA (K-DUR,KLOR-CON) 20 MEQ tablet Take 1 tablet (20 mEq total) by mouth daily. 90 tablet 3     rivaroxaban (XARELTO) 20 mg Tab Take 1 tablet (20 mg total) by mouth daily with supper. 30 tablet 6     rivastigmine (EXELON) 9.5 mg/24 hr Place 1 patch on the skin daily. 30 patch 12     venlafaxine (EFFEXOR XR) 75 MG 24 hr capsule Take 3 capsules (225 mg total) by mouth daily. 90 capsule 6     codeine-guaiFENesin (GUAIFENESIN AC)  mg/5 mL liquid Take 10 mL by mouth 4 (four) times a day as needed for cough. 120 mL 0     No current facility-administered medications for this visit.        Family History   Problem Relation Age of Onset     Pancreatic cancer Mother      Heart disease Father        Social History   Substance Use Topics     Smoking status: Former Smoker     Quit date: 2/16/1977     Smokeless tobacco: Never Used     Alcohol use No      Comment: rare no hx of problems or family concerns       Review of Systems:   General: WNL  Eyes: WNL  Ears/Nose/Throat: WNL  Lungs: Cough, Shortness of Breath  Heart: WNL  Stomach: Nausea  Bladder: Frequent Urination at Night  Muscle/Joints: WNL  Skin: WNL  Nervous System: Falls, Daytime Sleepiness, Dizziness, Loss of Balance  Mental Health: WNL     Blood: WNL      Objective:    /88 (Patient Site: Left Arm, Patient Position: Sitting, Cuff Size: Adult Regular)  Pulse (!) 56  Ht 5' 10\" (1.778 m)    Physical Exam  General Appearance:  Alert, well-appearing, in no acute distress.     HEENT:  Atraumatic, normocephalic; no scleral icterus, EOM intact; the mucous membranes were pink and moist; no obvious thyromegaly.   Chest: Chest symmetric, spine straight.     Lungs:   Respirations unlabored; the lungs are clear to auscultation.   Cardiovascular:   Auscultation reveals normal first and second heart sounds with no murmurs, rubs, or gallops.  Irregularly irregular rate and rhythm. No JVD.  " Radial and posterior tibial pulses are intact.    Abdomen:  Soft, nontender, nondistended, bowel sounds present.     Extremities: No cyanosis, clubbing, or edema.   Musculoskeletal:  Moves all extremities.     Skin: No xanthelasma.   Neurologic: Mood and affect are appropriate. Oriented to person, place, and situation.       Cardiographics (personally reviewed)  EC/10/2018 shows atrial fibrillation with controlled ventricular response with PVCs versus aberrantly conducted complexes, left anterior fascicular block.  ECG 10/17/2016 shows sinus rhythm at 62 bpm, possible left anterior fascicular block.    Echocardiogram: Done 2018    Left ventricle ejection fraction is mildly decreased. Left ventricular ejection fraction estimated 45-50%. Mild global hypokinesis.    Mild left ventricular hypertrophy    Right ventricular systolic function appears normal. Normal TAPSE    Possible right atrial enlargement    Mild to moderate mitral regurgitation    Moderate tricuspid insufficiency.    Pulmonary hypertension suggested. Estimate of RV systolic pressure 48 mmHg plus right atrial pressure.    Mild enlargement of the proximal aorta.    When compared to the previous study dated 2013, left ventricular systolic function now appears mildly reduced. Estimate of RV systolic pressure is mildly higher than previous examination.    Lab Review   Lab Results   Component Value Date    CREATININE 1.13 (H) 2018    BUN 25 2018     2018    K 3.1 (L) 2018     2018    CO2 30 2018     Lab Results   Component Value Date    TSH 1.35 2017     Lab Results   Component Value Date    WBC 11.9 (H) 2018    HGB 10.8 (L) 2018    HCT 33.2 (L) 2018    MCV 91 2018     2018         Greater than 45 minutes were spent face to face in this visit with more than 50% of the time discussing diagnoses as listed above, counseling, and coordination of  care.      Anabel Johnson, [facility], Electrophysiology  [phone]    This note has been dictated using voice recognition software. Any grammatical, typographical, or context distortions are unintentional and inherent to the software.

## 2021-06-15 NOTE — PROGRESS NOTES
Chief Complaint   Patient presents with     Cough     hx of pneumonia , ct done x 2 days ago      Abdominal Pain     left side with vomitting x 2 days         History of Present Illness: Nursing notes reviewed. Patient has had a cough associated with recent diagnosis of pneumonia from a recent CT scan of abdomen and pelvis on 01/08/2018. She has also had recent abdominal pain, which the CT scan was ordered to evaluate.  No acute abdominal problem was noted per reports.  She was started on azithromycin on 01/04/2018.  Her cough has persisted since then.  At the assisted living facility where she lives today, she was feeling more dizzy.  Her heart rate is noted to be slower than usual for her.  This is possibly related to the beta-blocker medication she takes.  No complaint of difficulty with breathing.  No complaint of chest discomfort.    Review of systems: See history of present illness, otherwise negative.     Current Outpatient Prescriptions   Medication Sig Dispense Refill     aspirin 81 MG EC tablet Take 1 tablet (81 mg total) by mouth daily. 90 tablet 3     atenolol (TENORMIN) 50 MG tablet Take 1 tablet (50 mg total) by mouth daily. 90 tablet 3     ciprofloxacin HCl (CIPRO) 250 MG tablet Take 1 tablet (250 mg total) by mouth 2 (two) times a day for 10 days. (for UTI) 20 tablet 0     codeine-guaiFENesin (GUAIFENESIN AC)  mg/5 mL liquid Take 10 mL by mouth 4 (four) times a day as needed for cough. 120 mL 0     famotidine (PEPCID) 20 MG tablet Take 1 tablet (20 mg total) by mouth 2 (two) times a day. (for stomach acid) 60 tablet 1     hydroCHLOROthiazide (MICROZIDE) 12.5 mg capsule TAKE 1 CAPSULE BY MOUTH EVERY DAY 90 capsule 3     meclizine (ANTIVERT) 25 mg tablet Take 1 tablet (25 mg total) by mouth 3 (three) times a day as needed for dizziness or nausea. 30 tablet 1     memantine (NAMENDA) 10 MG tablet Take 1 tablet (10 mg total) by mouth 2 (two) times a day. 180 tablet 3     nabumetone (RELAFEN) 500 MG  tablet TAKE 1 TO 2 TABLETS BY MOUTH DAILY 180 tablet 3     potassium chloride SA (K-DUR,KLOR-CON) 20 MEQ tablet Take 1 tablet (20 mEq total) by mouth daily. 90 tablet 3     predniSONE (DELTASONE) 20 MG tablet Take 1 tablet (20 mg total) by mouth 2 (two) times a day. (take with food)-for cough 10 tablet 0     rivastigmine (EXELON) 9.5 mg/24 hr Place 1 patch on the skin daily. 30 patch 12     venlafaxine (EFFEXOR XR) 75 MG 24 hr capsule Take 3 capsules (225 mg total) by mouth daily. 90 capsule 6     No current facility-administered medications for this visit.        Past Medical History:   Diagnosis Date     Abnormal EKG     noted pre-op 2014/ ST-T changes and poor R wave progress     Arthritis      Bruxism (teeth grinding)      Bursitis of hip 2014    left/surgical repair     Dementia      Diastolic dysfunction 2007    mild changes on echo     Fecal incontinence 09/27/2017    dementia/multifactorial- meds to be reviewed/declines GI consult     Hypertension      Pulmonary embolism 2008    post op/shoulder surgery     Raynaud phenomenon       Past Surgical History:   Procedure Laterality Date     [place] ARTHROPLASTY TIBIAL PLATEAU      Description: Knee Replacement;  Proc Date: 01/01/2009;     [place] ARTHROPLASTY TIBIAL PLATEAU      Description: Knee Replacement;  Proc Date: 01/01/2009;     [place] RECONSTR TOTAL SHOULDER IMPLANT      Description: Shoulder Arthroplasty Total Shoulder Replacement;  Proc Date: 05/19/2008;  Comments: post op pulmonary embolism      Social History     Social History     Marital status:      Spouse name: N/A     Number of children: 5     Years of education: N/A     Occupational History     retired      Social History Main Topics     Smoking status: Former Smoker     Quit date: 2/16/1977     Smokeless tobacco: Never Used     Alcohol use None      Comment: rare no hx of problems or family concerns     Drug use: None     Sexual activity: Not Asked     Other Topics Concern     None     Social History  "Narrative    .   in  pancreatic Ca; 5 grown kids; non smoker/minimal alcohol. Dementia type problems ~. Referred to -geriatric services.         In 2015- [facility];() fax [phone]; [phone]    \"Netta\" . multifactorial dementia with irritability and confabulation ;     Dr. Thompson /Geriatrics;     Daughters Ngozi kearney [phone]; /[phone].; 3 sons...; very unfortunate difficult situation for all involved. Non drinker/non smoker (2015); son Chidi (38)       History   Smoking Status     Former Smoker     Quit date: 1977   Smokeless Tobacco     Never Used      Exam:   Blood pressure 138/74, pulse (!) 51, temperature 97.6  F (36.4  C), temperature source Oral, weight 210 lb (95.3 kg), SpO2 94 %, not currently breastfeeding.    EXAM:   Wt Readings from Last 3 Encounters:   01/10/18 210 lb (95.3 kg)   17 168 lb (76.2 kg)   17 168 lb (76.2 kg)     Temp Readings from Last 3 Encounters:   01/10/18 97.6  F (36.4  C) (Oral)   18 98.5  F (36.9  C) (Tympanic)   17 97  F (36.1  C)     BP Readings from Last 3 Encounters:   01/10/18 138/74   18 118/78   17 134/70     Pulse Readings from Last 3 Encounters:   01/10/18 (!) 51   18 80   17 60     General: Vital signs reviewed. Patient is in no acute appearing distress being alert and pleasant.  She would occasionally need to have what I discussed clarified by her daughters.  Breathing is non labored appearing. Patient is oriented to her room surroundings, and her daughters and room.  Her lower heart rate is noted vital signs from today.  Her SPO2 is 91% earlier this month, with it being 94% at exam.  Heart:  Heart rate is normal, regular rhythm without murmur.  Lungs:  Lung sounds are clear to auscultation with good airflow except for crackles noted in right lower lobe and left lower lobe.  Skin: Warm and dry    Recent Results (from the past 24 hour(s))   HM1 (CBC " with Diff)   Result Value Ref Range    WBC 17.4 (H) 4.0 - 11.0 thou/uL    RBC 4.49 3.80 - 5.40 mill/uL    Hemoglobin 13.4 12.0 - 16.0 g/dL    Hematocrit 40.3 35.0 - 47.0 %    MCV 90 80 - 100 fL    MCH 29.7 27.0 - 34.0 pg    MCHC 33.1 32.0 - 36.0 g/dL    RDW 11.5 11.0 - 14.5 %    Platelets 339 140 - 440 thou/uL    MPV 7.8 7.0 - 10.0 fL    Neutrophils % 76 (H) 50 - 70 %    Lymphocytes % 14 (L) 20 - 40 %    Monocytes % 7 2 - 10 %    Eosinophils % 2 0 - 6 %    Basophils % 1 0 - 2 %    Neutrophils Absolute 13.2 (H) 2.0 - 7.7 thou/uL    Lymphocytes Absolute 2.4 0.8 - 4.4 thou/uL    Monocytes Absolute 1.3 (H) 0.0 - 0.9 thou/uL    Eosinophils Absolute 0.3 0.0 - 0.4 thou/uL    Basophils Absolute 0.2 0.0 - 0.2 thou/uL    Results from exam reviewed with patient and family members.  Her macroscopic urinalysis was normal.    Assessment/Plan   1. Abdominal pain  HM1(CBC and Differential)    HM1 (CBC with Diff)    Urinalysis-UC if Indicated       There are no Patient Instructions on file for this visit.   Demetris Álvarez DO

## 2021-06-16 PROBLEM — J18.9 HCAP (HEALTHCARE-ASSOCIATED PNEUMONIA): Status: ACTIVE | Noted: 2018-01-11

## 2021-06-16 PROBLEM — R06.89 ACUTE RESPIRATORY INSUFFICIENCY: Status: ACTIVE | Noted: 2018-01-11

## 2021-06-16 PROBLEM — Z66 DO NOT RESUSCITATE: Status: ACTIVE | Noted: 2017-09-07

## 2021-06-16 PROBLEM — J18.9 PNEUMONIA: Status: ACTIVE | Noted: 2018-01-11

## 2021-06-18 NOTE — LETTER
Letter by Melvin Thompson MD at      Author: Melvin Thompson MD Service: -- Author Type: --    Filed:  Encounter Date: 2/1/2019 Status: (Other)       Tl Trimble  [facility] [address] [place] [address]           February 1, 2019       Dear Mr. Guzman,    This letter is written as a result of your request for additional information regarding my patient, Farzaneh Layton, as it relates to her ability to name a power of (POA) and healthcare agent (HCA).  Ms. Layton has been a patient in my [facility] Program since November 2014 and has been seen by me on 14 occasions since 3/6/15.  Ms. Layton is believed to suffer from dementia that is multifactorial in etiology.  Potential contributors to the patient's cognitive impairments as assessed over the past 4 years has included cerebrovascular disease as well as neurodegenerative disease(s) including dementia the Alzheimer type and dementia with Lewy bodies.    It came to my attention on 11/21/18 that there was some conflict within the family regarding which child or children would be appropriate as decision makers for the patient's finance and health care.  I subsequently learned that the patient had, sometime in the past, executed a document naming a daughter or daughters as her healthcare agent(s).  Because of differences of opinion among children regarding mother's ability to execute such a document there was a request  that I render an opinion regarding the patient's decisional capacity related to these matters.    Farzaneh was assessed with respect to her decisional capacity most recently on 2 occasions, 11/21/18 and 1/17/19.  Farzaneh demonstrated consistency in requesting that daughters Ngozi and Stephanie both serve in the role of Power of POA and healthcare agent HCA. Farzaneh was able to state with some clarity information important in making this decision.  She also seem to  "demonstrate an understanding of the relevance of this information for her particular situation as well as the ability to manipulate information appropriately so as to arrive at her decision.  This last capability, that of the ability to manipulate information so as to arrive at a sound decision, was demonstrated by Farzaneh's recognition that both Stephanie and Ngozi have been the children who have been most  important in ensuring her access to needed healthcare.  Farzaneh communicated to me her belief that, despite the fact that she \"loves\" all of her children, she believes it most important to entrust POA and HCA responsibilities to both Stephanie and Ngozi because of the reliability that these 2 children have demonstrated in attending to her health care and general needs.    I hope this information proves helpful to you.  Please do not hesitate to contact me with any questions.        Sincerely,        Electronically signed by Melvin Thompson MD           "

## 2021-06-21 NOTE — PROGRESS NOTES
Persons accompanying you (the patient) today: DAUGHTERS     How have you been doing since we saw you last? Please note any concerns.  Pt has been having some issues with sleeping.  Pt would like to discuss other issues personal that she would only like to discuss with Dr. Thompson. I was unable to get a BP on pt she stated that it was too painful.     Please list any recent hospitalizations/surgeries/procedures you've had since we saw you last:  3/6/2018 Pt was seen in ED for a fall.     Have you had any falls since your last visit? Yes: 3/06/2018    Do you have any pain today? No

## 2021-06-21 NOTE — PROGRESS NOTES
Assessment / Impression   1.  Dementia the Alzheimer type  2.  Depression in remission  3.  Decisional capacity matters    Plan:   1.  Continue present therapies  2.  With respect to decisional capacity I have outlined for the family the nature of capacity and this assessment particularly as it relates to a neurodegenerative clearly today the patient is quite consistent and recognizing her 2 daughters Ngozi and Farzaneh as being the 2 children she would like to have making decisions on her behalf and this declaration is, in fact, consistent with changes she made to, I believe, her power of  and healthcare power documents within the past year.  A visit to more specifically address decisional capacity in this regard would be required.  I have asked the family to contact their  so that we may determine whether such a follow-up appointment is necessary.    Total time for this evaluation 45 minutes with majority time spent in counseling.      Subjective:     HPI: Farzaneh Layton is a 83 y.o. female with above-noted diagnoses who is seen in follow-up.  The patient appears to be feeling well at this time and I note her to be socially appropriate.  Residing within a long-term care environment, the patient is able to attend outings on a limited basis with little in the way of significant confusion or dysfunctional behaviors noted by family members.  Today the patient is alert pleasant and directable.  She offers no complaints at this time.          Review of Systems:          As above        Objective:   There were no vitals filed for this visit.    No results found for this or any previous visit (from the past 24 hour(s)).    Physical Exam: As noted previously.  From a cognitive perspective I do note the patient to be alert.  Attentional ability is fairly good although I do note some fatigue with increasing complexity of attentional tasks.  Working memory ability and ability to cognitively set shift  appears to be good.  The patient has some difficulty initiating task but can persist appropriately.  As noted, she does appear to be making consistent choices with respect to the matters noted above.  I do not have enough time to assess other aspects of decisional capacity as well as cognitive functions relevant to these aspects.  I should mention, however, the patient did appear to have the ability to in the basic way described to me the nature of the conversation I had with her daughters with respect to decisional capacity and the nature of the family conflict.  This seemed to indicate some ability to encode new information.  Attentional capabilities and ability to encode new information would appear to be at least partially preserved and possibly robust enough to contribute to decisional capacity in these matters.      Affect is pleasant and mood congruent.

## 2021-06-22 NOTE — TELEPHONE ENCOUNTER
"Kvng, son, called today in regards to wanting to talk to Dr. Thompson to let him know he may be subpoenaed to appear as a witness in court on 2/22/19 at 1:30pm. This writer advised Kvng to connect with her healthcare agent. He stated \"I am her health care agent and I am her \". This writer noted two healthcare directives in file. The latest version of directive states the daughter Farzaneh as agent. This writer spoke with  Sri and she will reach out to Farzaneh to address this issue.   "

## 2021-06-22 NOTE — PROGRESS NOTES
Assessment / Impression   1.  Dementia the Alzheimer type  2.  Depression in remission  3.  Insomnia improved with present therapies  4.  Capacity to establish power of  and healthcare agent      Plan:   1.  Continue present therapies    Long conversation held with the patient and daughter Ngozi in attendance.  The patient was interviewed today as a follow-up to prior interview performed on November 21, 2018.  Today the patient was noted to demonstrate the ability to make a clear and consistent choice with respect to her wishes regarding healthcare and financial agents.  She demonstrated a basic understanding of the issues at hand important to making these choices and was able to articulate the relevance of this information in making her decisions.  In addition, a basic degree of reasoning ability was also established further validating her decisions.  It is because of these facts that I believe the patient retains the capacity at this point in time to name both power of  and healthcare agent.    Total time for this evaluation was 30 minutes.      Subjective:     HPI: Farzaneh Layton is a 83 y.o. female with above-noted diagnoses who returns for follow-up.  I would refer the reader to my note dated 11/21/18 for background.  The patient was brought back today to continue evaluation relevant to the matter of decisional capacity to name a power of  and healthcare agent.    Farzaneh was neatly groomed casually dressed and seated for assessment.  She was noted to be alert and interactive through the entire session.  She accurately stated the purpose of this visit related to ongoing conflicts with that she was having with children regarding matters relevant to financial and medical management.  She once again, and without cues, promptly and confidently stated her desire that Ngozi and Farzaneh serve as both POA and healthcare agent.  This choice is noted to be consistent with the choices  "repeatedly made during the 11/21/18 interview.  She acknowledged the importance of establishing both power of  and healthcare power given her age and current medical status.  She stated that, although she loves all of her children, she believes that Ngozi and Farzaneh would be most appropriate to serve in these roles because of her more frequent and consistent contact with these children and because \"I trust them.\"    And no time during the interview did I become aware of any significant confusion or inattention experienced by the patient.  Although cognitively impaired, she did still demonstrate the ability to manipulate, in a basic way, information relevant to making these choices.  Also, and most importantly, the choices she made today were consistent with choices made in the past.          Review of Systems:          NA        Objective:   There were no vitals filed for this visit.    No results found for this or any previous visit (from the past 24 hour(s)).    Physical Exam: As noted above.  The patient demonstrated no evidence of physical distress.  No evidence of emotional distress.  Again cognitively, she was noted to be alert attentive and engaged.  Very little in the way of cueing was required during the course of interview and at no time was redirection required.                "

## 2021-06-22 NOTE — PROGRESS NOTES
"Writer was asked by nurse to clarify who the healthcare agent is in the medical record being that the patient's son called wanting to speak with Dr. Thompson about patient indicating that he was the healthcare agent.    In reviewing the medical record, it appears that patient's son was the healthcare agent in 2015 although, there was an updated healthcare directive established on December 18, 2017 naming the patient's daughter Farzaneh Layton as primary healthcare agent and Ngozi Carbajal as the alternate healthcare agent.  Patient's daughter stated, \"my brother is fully aware that there was a new healthcare directive done.\"    Sri Garcia [facility]  1600  1/9/19  "

## 2021-06-22 NOTE — PROGRESS NOTES
Persons accompanying you (the patient) today: Daughter Ngozi    How have you been doing since we saw you last? Please note any concerns.  Pt. Says she has been doing good since the last time we saw her,    Please list any recent hospitalizations/surgeries/procedures you've had since we saw you last:  none    Have you had any falls since your last visit? No    Do you have any pain today? No

## 2021-06-23 NOTE — TELEPHONE ENCOUNTER
Ty Guzman  said Ngozi stated that Dr. Thompson can write a letter but the  wants testimony wondering if Dr Thompson will be available for testimony if necessary regarding Farzaneh Layton.

## 2021-06-23 NOTE — PROGRESS NOTES
Ty Guzman,  for Netta, called today to speak to Dr. Thompson in regards to testifying or writing a letter for court on February 22,2019 at 1:30pm. He would like to talk to Dr. Thompson directly when he has some time. This writer gave Dr. Thompson the direct number to Ty.

## 2021-06-24 NOTE — TELEPHONE ENCOUNTER
RN cannot approve Refill Request    RN can NOT refill this medication PCP messaged that patient is overdue for Office Visit.         Lary Lemons, [facility] Triage/Med Refill 2/13/2019    Requested Prescriptions   Pending Prescriptions Disp Refills     rivastigmine (EXELON) 9.5 mg/24 hr [Pharmacy Med Name: RIVASTIGMINE 9.5MG/24HR PATCH]       Sig: APPLY 1 PATCH TOPICALLY ONCE DAILY    Cholinesterase Inhibitor/Anti-Alzheimer Agent Refill Protocol Failed - 2/12/2019  7:41 AM       Failed - Visit with PCP or prescribing provider visit in last 6 months or next 3 months    Last office visit with prescriber/PCP: Visit date not found OR same dept: Visit date not found OR same specialty: 1/4/2018 Deacon Bowman MD Last physical: Visit date not found Last MTM visit: Visit date not found     Next appt within 3 mo: Visit date not found  Next physical within 3 mo: Visit date not found  Prescriber OR PCP: Deacon Bowman MD  Last diagnosis associated with med order: 1. Dementia  - rivastigmine (EXELON) 9.5 mg/24 hr [Pharmacy Med Name: RIVASTIGMINE 9.5MG/24HR PATCH]; APPLY 1 PATCH TOPICALLY ONCE DAILY    If protocol passes may refill for 6 months if within 3 months of last provider visit (or a total of 9 months).

## 2021-06-26 NOTE — PROGRESS NOTES
Progress Notes by Anabel Johnson CNP at 5/2/2018 12:50 PM     Author: Anabel Johnson CNP Service: -- Author Type: Nurse Practitioner    Filed: 5/2/2018  3:32 PM Encounter Date: 5/2/2018 Status: Signed    : Anabel Johnson CNP (Nurse Practitioner)           Click to link to [facility]     [facility] HEART CARE ELECTROPHYSIOLOGY NOTE      Assessment/Recommendations   Assessment/Plan:    1.  Persistent atrial fibrillation:  Ventricular rates are well controlled.  We reviewed and options of rate control versus rhythm control depending upon the presence of symptoms.  As she is asymptomatic, recommend continuing with a rate control strategy.  If her heart rates are consistently less than 55 bpm, recommend decreasing atenolol to 25 mg daily.    She was reassured that atrial fibrillation is not life-threatening, but carries an increased risk for stroke.  She has a [id number]-[id number] score of 6 for age >75, female gender, hypertension, and prior PE.  We discussed risk for stroke versus risk for bleeding on a blood thinner.  We had a lengthy discussion of options including switching to aspirin, staying on Xarelto, or left atrial appendage occlusion with the watchman device.  However, with her advanced degree of dementia, we all agreed that she is not a candidate for LAAO.  At this point, they will concentrate on efforts to reduce falls, including PT/OT, and reminders for her to call for assistance when getting out of bed, particularly at night.  Consider switching Xarelto to daily aspirin if frequency of falls increases.    2.  Hypertension: Blood pressure at target today.    Follow-up as needed.     History of Present Illness    Ms. Farzaneh Layton is a very pleasant 83 y.o. female who comes in today accompanied by her 2 daughters for EP follow-up of atrial fibrillation.  She was newly diagnosed with atrial fibrillation with controlled ventricular response on 1/10/2018 a subsequent hospitalization for  pneumonia.  Onset is unknown as he appeared to be asymptomatic.  Ventricular response is well controlled with atenolol.  She was started on Xarelto for stroke prophylaxis.  She also has a history of pulmonary embolism after shoulder surgery in 2008, hypertension, and diastolic dysfunction without chronic heart failure.    Netta Peterson continues to have frequent falls, occurring at least once a month.  We previously discussed left atrial appendage occlusion with the watchman device mother though she is not ideal candidate due to advanced dementia for which she lives in a memory care unit.  She states that she feels well and denies chest comfort, palpitations, shortness of breath, lightheadedness, or syncope.  She will now be receiving primary care services through the team who works at the [facility].  She will also be starting PT and OT.    Cardiographics (personally reviewed):  EKG, Done 1/10/2018 shows atrial fibrillation with controlled ventricular response at 55 bpm with PVCs versus aberrantly conducted complexes, left anterior fascicular block    ECHO, Done 1/13/2018:     Left ventricle ejection fraction is mildly decreased. Left ventricular ejection fraction estimated 45-50%. Mild global hypokinesis.    Mild left ventricular hypertrophy    Right ventricular systolic function appears normal. Normal TAPSE    Possible right atrial enlargement    Mild to moderate mitral regurgitation    Moderate tricuspid insufficiency.    Pulmonary hypertension suggested. Estimate of RV systolic pressure 48 mmHg plus right atrial pressure.    Mild enlargement of the proximal aorta.    When compared to the previous study dated 5/24/2013, left ventricular systolic function now appears mildly reduced. Estimate of RV systolic pressure is mildly higher than previous examination.     Physical Examination Review of Systems   Vitals:    05/02/18 1253   BP: 126/78   Pulse: (!) 52   Resp: 28   Pulse recheck 64 bpm    There is no height  or weight on file to calculate BMI.  Wt Readings from Last 3 Encounters:   03/06/18 170 lb (77.1 kg)   01/13/18 192 lb 2 oz (87.1 kg)   01/10/18 190 lb (86.2 kg)     General Appearance:   Alert, well-appearing and in no acute distress.   HEENT: Atraumatic, normocephalic.  No scleral icterus, normal conjunctivae, EOM intact, PERRL; mucous membranes pink and moist.     Chest: Chest symmetric, spine straight.   Lungs:   Respirations unlabored: Lungs are clear to auscultation.   Cardiovascular:   Normal first and second heart sounds with no murmurs, rubs, or gallops.  Irregularly irregular rate and rhythm.  Radial and posterior tibial pulses are intact, no edema.   Abdomen:  Soft, nontender, nondistended, bowel sounds present   Extremities: No cyanosis or clubbing   Musculoskeletal: Moves all extremities   Skin: Warm, dry, intact.    Neurologic: Mood and affect are appropriate, alert and oriented to person and place    General: WNL  Eyes: WNL  Ears/Nose/Throat: WNL  Lungs: Shortness of Breath  Heart: Shortness of Breath with activity, Irregular Heartbeat, Fainting  Stomach: Diarrhea, Nausea  Bladder: WNL  Muscle/Joints: Muscle Weakness  Skin: WNL  Nervous System: Falls, Daytime Sleepiness, Dizziness, Loss of Balance  Mental Health: Confusion, Depression     Blood: WNL     Medical History  Surgical History Family History Social History   Past Medical History:   Diagnosis Date   ? Abnormal EKG     noted pre-op 2014/ ST-T changes and poor R wave progress   ? Arthritis    ? Atrial fibrillation    ? Bruxism (teeth grinding)    ? Bursitis of hip 2014    left/surgical repair   ? Dementia    ? Diastolic dysfunction 2007    mild changes on echo   ? Fecal incontinence 09/27/2017    dementia/multifactorial- meds to be reviewed/declines GI consult   ? Hypertension    ? Pulmonary embolism 2008    post op/shoulder surgery   ? Raynaud phenomenon     Past Surgical History:   Procedure Laterality Date   ? NE ARTHROPLASTY TIBIAL PLATEAU   "    Description: Knee Replacement;  Proc Date: 2009;   ? LA ARTHROPLASTY TIBIAL PLATEAU      Description: Knee Replacement;  Proc Date: 2009;   ? LA RECONSTR TOTAL SHOULDER IMPLANT      Description: Shoulder Arthroplasty Total Shoulder Replacement;  Proc Date: 2008;  Comments: post op pulmonary embolism    Family History   Problem Relation Age of Onset   ? Pancreatic cancer Mother    ? Heart disease Father     Social History     Social History   ? Marital status:      Spouse name: N/A   ? Number of children: 5   ? Years of education: N/A     Occupational History   ? retired      Social History Main Topics   ? Smoking status: Former Smoker     Quit date: 1977   ? Smokeless tobacco: Never Used   ? Alcohol use No      Comment: rare no hx of problems or family concerns   ? Drug use: No   ? Sexual activity: Not on file     Other Topics Concern   ? Not on file     Social History Narrative    .   in  pancreatic Ca; 5 grown kids; non smoker/minimal alcohol. Dementia type problems ~. Referred to -geriatric services.         In 2015- [facility];(2015) fax [phone]; [phone]    \"Netta\" . multifactorial dementia with irritability and confabulation ;     Dr. Thompson /Geriatrics;     Daughters Ngozi kearney [phone]; /[phone].; 3 sons...; very unfortunate difficult situation for all involved. Non drinker/non smoker (2015); son Chidi (38)          Medications  Allergies   Current Outpatient Prescriptions   Medication Sig Dispense Refill   ? acetaminophen (TYLENOL) 500 MG tablet Take 1,000 mg by mouth every 6 (six) hours as needed for pain.     ? aspirin 81 MG EC tablet Take 1 tablet (81 mg total) by mouth daily. 90 tablet 3   ? atenolol (TENORMIN) 50 MG tablet Take 1 tablet (50 mg total) by mouth daily. 90 tablet 3   ? codeine-guaiFENesin (GUAIFENESIN AC)  mg/5 mL liquid Take 10 mL by mouth 4 (four) times a day as needed for cough. " 120 mL 0   ? hydroCHLOROthiazide (MICROZIDE) 12.5 mg capsule Take 12.5 mg by mouth daily.     ? meclizine (ANTIVERT) 25 mg tablet Take 1 tablet (25 mg total) by mouth 3 (three) times a day as needed for dizziness or nausea. 30 tablet 1   ? memantine (NAMENDA) 10 MG tablet Take 1 tablet (10 mg total) by mouth 2 (two) times a day. 180 tablet 3   ? nabumetone (RELAFEN) 500 MG tablet Take 500 mg by mouth daily.     ? nabumetone (RELAFEN) 500 MG tablet Take 500 mg by mouth daily as needed for pain (in addition to scheduled dose if needed).     ? potassium chloride SA (K-DUR,KLOR-CON) 20 MEQ tablet Take 1 tablet (20 mEq total) by mouth daily. 90 tablet 3   ? rivaroxaban (XARELTO) 20 mg Tab Take 1 tablet (20 mg total) by mouth daily with supper. 30 tablet 6   ? rivastigmine (EXELON) 9.5 mg/24 hr Place 1 patch on the skin daily. 30 patch 12   ? venlafaxine (EFFEXOR XR) 75 MG 24 hr capsule Take 3 capsules (225 mg total) by mouth daily. 90 capsule 6   ? white petrolatum (AQUAPHOR ORIGINAL) 41 % Oint Apply 1 application topically 2 (two) times a day as needed.       No current facility-administered medications for this visit.       No Known Allergies   Medical, surgical, family, social history, and medications were all reviewed and updated as necessary.   Lab Results    Chemistry CBC Other   Lab Results   Component Value Date    CREATININE 0.88 01/22/2018    BUN 34 (H) 01/22/2018     (H) 01/22/2018    K 3.8 01/22/2018     (H) 01/22/2018    CO2 28 01/22/2018     Creatinine (mg/dL)   Date Value   01/22/2018 0.88   01/13/2018 1.13 (H)   01/11/2018 0.92   01/10/2018 1.17 (H)    Lab Results   Component Value Date    WBC 11.9 (H) 01/13/2018    HGB 10.8 (L) 01/13/2018    HCT 33.2 (L) 01/13/2018    MCV 91 01/13/2018     01/13/2018    Lab Results   Component Value Date    INR 1.06 01/10/2018     Lab Results   Component Value Date    TSH 1.35 05/03/2017     Lab Results   Component Value Date    CKMB 2 01/11/2013     TROPONINI <0.01 01/10/2018            Greater than than 45 minutes were spent face to face in this visit with more than 50% spent discussing diagnoses as listed above, counseling, and coordination of care.    This note has been dictated using voice recognition software. Any grammatical, typographical, or context distortions are unintentional and inherent to the software.    Anabel Johnson, [facility]   Electrophysiology

## 2021-07-03 NOTE — ADDENDUM NOTE
Addendum Note by Nayely Bowman MD at 1/8/2018  8:51 AM     Author: Nayely Bowman MD Service: -- Author Type: Physician    Filed: 1/8/2018  8:51 AM Encounter Date: 1/4/2018 Status: Signed    : Nayely Bowman MD (Physician)    Addended by: NAYELY BOWMAN on: 1/8/2018 08:51 AM        Modules accepted: Orders

## 2021-07-03 NOTE — ADDENDUM NOTE
Addendum Note by Sabiha Lubin LPN at 1/12/2017  4:45 PM     Author: Sabiha Lubin LPN Service: -- Author Type: Licensed Nurse    Filed: 1/12/2017  4:45 PM Encounter Date: 1/12/2017 Status: Signed    : Sabiha Lubin LPN (Licensed Nurse)    Addended by: SABIHA LUBIN on: 1/12/2017 04:45 PM        Modules accepted: Orders

## 2021-07-03 NOTE — ADDENDUM NOTE
Addendum Note by Richie Grullon MA at 4/24/2017  3:21 PM     Author: Richie Grullon MA Service: -- Author Type: Medical Assistant    Filed: 4/24/2017  3:21 PM Date of Service: 4/24/2017  3:21 PM Status: Signed    : Richie Grullon MA (Medical Assistant)    Encounter addended by: Richie Grullon MA on: 4/24/2017  3:21 PM<BR>     Actions taken: Charge Capture section accepted

## 2021-07-03 NOTE — ADDENDUM NOTE
Addendum Note by Nohemy Almonte CMA at 1/4/2019 11:59 PM     Author: Nohemy Almonte CMA Service: -- Author Type: Certified Medical Assistant    Filed: 1/7/2019  9:49 AM Date of Service: 1/4/2019 11:59 PM Status: Signed    : Nohemy Almonte CMA (Certified Medical Assistant)    Encounter addended by: Nohemy Almonte CMA on: 1/7/2019  9:49 AM      Actions taken: Charge Capture section accepted

## 2021-07-03 NOTE — ADDENDUM NOTE
Addendum Note by Reba Castillo RN at 5/18/2017 11:59 PM     Author: Reba Castillo RN Service: -- Author Type: Registered Nurse    Filed: 5/23/2017 10:20 AM Date of Service: 5/18/2017 11:59 PM Status: Signed    : Reba Castillo RN (Registered Nurse)    Encounter addended by: Reba Castillo RN on: 5/23/2017 10:20 AM<BR>     Actions taken: Visit diagnoses modified, Charge Capture section accepted

## 2021-07-03 NOTE — ADDENDUM NOTE
Addendum Note by Nayely Bowman MD at 1/12/2017  4:20 PM     Author: Nayely Bowman MD Service: -- Author Type: Physician    Filed: 1/12/2017  4:20 PM Encounter Date: 1/12/2017 Status: Signed    : Nayely Bowman MD (Physician)    Addended by: NAYELY BOWMAN on: 1/12/2017 04:20 PM        Modules accepted: Orders

## 2021-07-03 NOTE — ADDENDUM NOTE
Addendum Note by Nohemy Almonte at 5/3/2017  3:42 PM     Author: Nohemy Almonte Service: -- Author Type: Certified Medical Assistant    Filed: 5/3/2017  3:42 PM Date of Service: 5/3/2017  3:42 PM Status: Signed    : Nohemy Almonte    Encounter addended by: Nohemy Almonte CMA on: 5/3/2017  3:42 PM<BR>     Actions taken: Charge Capture section accepted

## 2021-07-04 ENCOUNTER — HEALTH MAINTENANCE LETTER (OUTPATIENT)
Age: 86
End: 2021-07-04

## 2021-07-13 ENCOUNTER — RECORDS - HEALTHEAST (OUTPATIENT)
Dept: ADMINISTRATIVE | Facility: CLINIC | Age: 86
End: 2021-07-13

## 2021-07-21 ENCOUNTER — RECORDS - HEALTHEAST (OUTPATIENT)
Dept: ADMINISTRATIVE | Facility: CLINIC | Age: 86
End: 2021-07-21

## 2021-10-01 PROBLEM — Z59.89 OTHER PROBLEMS RELATED TO HOUSING AND ECONOMIC CIRCUMSTANCES: Status: ACTIVE | Noted: 2017-09-07

## 2021-10-23 ENCOUNTER — HEALTH MAINTENANCE LETTER (OUTPATIENT)
Age: 86
End: 2021-10-23

## 2022-07-30 ENCOUNTER — HEALTH MAINTENANCE LETTER (OUTPATIENT)
Age: 87
End: 2022-07-30

## 2022-10-10 ENCOUNTER — HEALTH MAINTENANCE LETTER (OUTPATIENT)
Age: 87
End: 2022-10-10

## 2023-08-20 ENCOUNTER — HEALTH MAINTENANCE LETTER (OUTPATIENT)
Age: 88
End: 2023-08-20